# Patient Record
Sex: MALE | HISPANIC OR LATINO | Employment: UNEMPLOYED | URBAN - METROPOLITAN AREA
[De-identification: names, ages, dates, MRNs, and addresses within clinical notes are randomized per-mention and may not be internally consistent; named-entity substitution may affect disease eponyms.]

---

## 2022-02-11 ENCOUNTER — OFFICE VISIT (OUTPATIENT)
Dept: INTERNAL MEDICINE | Facility: CLINIC | Age: 47
End: 2022-02-11

## 2022-02-11 VITALS
TEMPERATURE: 98.2 F | OXYGEN SATURATION: 98 % | SYSTOLIC BLOOD PRESSURE: 133 MMHG | HEIGHT: 70 IN | HEART RATE: 88 BPM | BODY MASS INDEX: 38.2 KG/M2 | WEIGHT: 266.8 LBS | DIASTOLIC BLOOD PRESSURE: 90 MMHG

## 2022-02-11 DIAGNOSIS — M54.2 ANTERIOR NECK PAIN: Primary | ICD-10-CM

## 2022-02-11 DIAGNOSIS — Z00.00 ANNUAL PHYSICAL EXAM: ICD-10-CM

## 2022-02-11 DIAGNOSIS — Z11.59 NEED FOR HEPATITIS C SCREENING TEST: ICD-10-CM

## 2022-02-11 DIAGNOSIS — R03.0 ELEVATED BLOOD PRESSURE READING: ICD-10-CM

## 2022-02-11 PROCEDURE — 99204 OFFICE O/P NEW MOD 45 MIN: CPT | Performed by: NURSE PRACTITIONER

## 2022-02-11 RX ORDER — TRAZODONE HYDROCHLORIDE 50 MG/1
50 TABLET ORAL NIGHTLY
COMMUNITY
End: 2023-02-10 | Stop reason: SDUPTHER

## 2022-02-11 RX ORDER — CLONAZEPAM 0.5 MG/1
0.5 TABLET ORAL NIGHTLY PRN
COMMUNITY
End: 2022-06-09

## 2022-02-13 NOTE — PROGRESS NOTES
"Chief Complaint  Establish Care (pt states he turned his neck a certain way and he heard a pop or a crack and he feels pain. he says when he swallows he also hears the pop or click. )  Subjective        History of Present Illness  Laci Kellogg is a 46 y.o. male who presents to [facility] to establish care and for complaint of neck pain for 2 weeks. This started while laying in bed. When he turns his neck or swallows he hears a popping and feels pain  The pain is located anterior left side. Negative for numbness or tingling.  He denies injury or trauma.    Past Medical History:   Diagnosis Date   • Anxiety    • Insomnia         History reviewed. No pertinent surgical history.     No Known Allergies       Current Outpatient Medications:   •  clonazePAM (KlonoPIN) 0.5 MG tablet, Take 0.5 mg by mouth At Night As Needed for Anxiety., Disp: , Rfl:   •  traZODone (DESYREL) 100 MG tablet, Take 50 mg by mouth Every Night., Disp: , Rfl:   •  diclofenac (VOLTAREN) 50 MG EC tablet, Take 1 tablet by mouth 2 (Two) Times a Day., Disp: 28 tablet, Rfl: 0    Objective   /90 (BP Location: Left arm, Patient Position: Sitting, Cuff Size: Adult)   Pulse 88   Temp 98.2 °F (36.8 °C) (Temporal)   Ht 177.8 cm (70\")   Wt 121 kg (266 lb 12.8 oz)   SpO2 98%   BMI 38.28 kg/m²    Estimated body mass index is 38.28 kg/m² as calculated from the following:    Height as of this encounter: 177.8 cm (70\").    Weight as of this encounter: 121 kg (266 lb 12.8 oz).   Physical Exam  Vitals reviewed.   HENT:      Head: Normocephalic and atraumatic.      Comments: Left TMJ non-tender to palpation  Neck:      Comments: Cervical spine non-tender to palpation; free active ROM; anterior left lateral neck base with tenderness to palpation and edema  Pulmonary:      Effort: Pulmonary effort is normal.   Neurological:      Mental Status: He is alert.   Psychiatric:         Mood and Affect: Mood is anxious.         " Speech: Speech normal.         Behavior: Behavior is cooperative.         Thought Content: Thought content normal.        Result Review :                   Assessment and Plan    Diagnoses and all orders for this visit:    1. Anterior neck pain (Primary)  Comments:  Use anti-inflammatory for 2 weeks and take with food. Use ice or heat therapy. U/S to check swelling; consider further testing if no improvement.  Orders:  -     US Head Neck Soft Tissue; Future  -     diclofenac (VOLTAREN) 50 MG EC tablet; Take 1 tablet by mouth 2 (Two) Times a Day.  Dispense: 28 tablet; Refill: 0    2. Elevated blood pressure reading  Comments:  Advise to get BP home monitor and check twice daily and return with readings.    3. Annual physical exam  Comments:  Wellness labs ordered.  Orders:  -     Comprehensive Metabolic Panel; Future  -     CBC & Differential; Future  -     Lipid Panel; Future  -     T4, Free; Future  -     TSH; Future  -     Vitamin D 25 Hydroxy; Future    4. Need for hepatitis C screening test  -     Hepatitis C Antibody; Future      Follow Up     Patient was given instructions and counseling regarding his condition. Please see specific information pulled into the AVS if appropriate.   Return in about 2 weeks (around 2/25/2022).    ELIANA Cazares

## 2022-02-15 ENCOUNTER — HOSPITAL ENCOUNTER (OUTPATIENT)
Dept: ULTRASOUND IMAGING | Facility: HOSPITAL | Age: 47
Discharge: HOME | End: 2022-02-15
Admitting: NURSE PRACTITIONER

## 2022-02-15 DIAGNOSIS — M54.2 ANTERIOR NECK PAIN: ICD-10-CM

## 2022-02-15 PROCEDURE — 76536 US EXAM OF HEAD AND NECK: CPT

## 2022-02-17 ENCOUNTER — TELEPHONE (OUTPATIENT)
Dept: INTERNAL MEDICINE | Facility: CLINIC | Age: 47
End: 2022-02-17

## 2022-02-17 NOTE — TELEPHONE ENCOUNTER
The pt would like to have the MRI done, if you could put that in he would greatly appreciate it. I would pend it but I dont know what MRI to pend.

## 2022-02-18 ENCOUNTER — APPOINTMENT (OUTPATIENT)
Dept: ULTRASOUND IMAGING | Facility: HOSPITAL | Age: 47
End: 2022-02-18

## 2022-02-18 DIAGNOSIS — M54.2 ANTERIOR NECK PAIN: Primary | ICD-10-CM

## 2022-02-22 ENCOUNTER — LAB (OUTPATIENT)
Dept: LAB | Facility: HOSPITAL | Age: 47
End: 2022-02-22

## 2022-02-22 DIAGNOSIS — Z00.00 ANNUAL PHYSICAL EXAM: ICD-10-CM

## 2022-02-22 DIAGNOSIS — Z11.59 NEED FOR HEPATITIS C SCREENING TEST: ICD-10-CM

## 2022-02-22 LAB
25(OH)D3 SERPL-MCNC: 28.3 NG/ML (ref 30–100)
ALBUMIN SERPL-MCNC: 4.6 G/DL (ref 3.5–5.2)
ALBUMIN/GLOB SERPL: 2.2 G/DL
ALP SERPL-CCNC: 82 U/L (ref 39–117)
ALT SERPL W P-5'-P-CCNC: 20 U/L (ref 1–41)
ANION GAP SERPL CALCULATED.3IONS-SCNC: 13 MMOL/L (ref 5–15)
AST SERPL-CCNC: 16 U/L (ref 1–40)
BASOPHILS # BLD AUTO: 0.06 10*3/MM3 (ref 0–0.2)
BASOPHILS NFR BLD AUTO: 0.6 % (ref 0–1.5)
BILIRUB SERPL-MCNC: 0.7 MG/DL (ref 0–1.2)
BUN SERPL-MCNC: 12 MG/DL (ref 6–20)
BUN/CREAT SERPL: 11.9 (ref 7–25)
CALCIUM SPEC-SCNC: 9.7 MG/DL (ref 8.6–10.5)
CHLORIDE SERPL-SCNC: 100 MMOL/L (ref 98–107)
CHOLEST SERPL-MCNC: 206 MG/DL (ref 0–200)
CO2 SERPL-SCNC: 24 MMOL/L (ref 22–29)
CREAT SERPL-MCNC: 1.01 MG/DL (ref 0.76–1.27)
DEPRECATED RDW RBC AUTO: 45.1 FL (ref 37–54)
EOSINOPHIL # BLD AUTO: 0.17 10*3/MM3 (ref 0–0.4)
EOSINOPHIL NFR BLD AUTO: 1.7 % (ref 0.3–6.2)
ERYTHROCYTE [DISTWIDTH] IN BLOOD BY AUTOMATED COUNT: 13.6 % (ref 12.3–15.4)
GFR SERPL CREATININE-BSD FRML MDRD: 80 ML/MIN/1.73
GFR SERPL CREATININE-BSD FRML MDRD: 97 ML/MIN/1.73
GLOBULIN UR ELPH-MCNC: 2.1 GM/DL
GLUCOSE SERPL-MCNC: 79 MG/DL (ref 65–99)
HCT VFR BLD AUTO: 46.6 % (ref 37.5–51)
HCV AB SER DONR QL: NORMAL
HDLC SERPL-MCNC: 48 MG/DL (ref 40–60)
HGB BLD-MCNC: 15.5 G/DL (ref 13–17.7)
IMM GRANULOCYTES # BLD AUTO: 0.03 10*3/MM3 (ref 0–0.05)
IMM GRANULOCYTES NFR BLD AUTO: 0.3 % (ref 0–0.5)
LDLC SERPL CALC-MCNC: 141 MG/DL (ref 0–100)
LDLC/HDLC SERPL: 2.91 {RATIO}
LYMPHOCYTES # BLD AUTO: 1.61 10*3/MM3 (ref 0.7–3.1)
LYMPHOCYTES NFR BLD AUTO: 15.7 % (ref 19.6–45.3)
MCH RBC QN AUTO: 30.1 PG (ref 26.6–33)
MCHC RBC AUTO-ENTMCNC: 33.3 G/DL (ref 31.5–35.7)
MCV RBC AUTO: 90.5 FL (ref 79–97)
MONOCYTES # BLD AUTO: 0.64 10*3/MM3 (ref 0.1–0.9)
MONOCYTES NFR BLD AUTO: 6.2 % (ref 5–12)
NEUTROPHILS NFR BLD AUTO: 7.75 10*3/MM3 (ref 1.7–7)
NEUTROPHILS NFR BLD AUTO: 75.5 % (ref 42.7–76)
NRBC BLD AUTO-RTO: 0 /100 WBC (ref 0–0.2)
PLATELET # BLD AUTO: 329 10*3/MM3 (ref 140–450)
PMV BLD AUTO: 10.5 FL (ref 6–12)
POTASSIUM SERPL-SCNC: 4.5 MMOL/L (ref 3.5–5.2)
PROT SERPL-MCNC: 6.7 G/DL (ref 6–8.5)
RBC # BLD AUTO: 5.15 10*6/MM3 (ref 4.14–5.8)
SODIUM SERPL-SCNC: 137 MMOL/L (ref 136–145)
T4 FREE SERPL-MCNC: 1.46 NG/DL (ref 0.93–1.7)
TRIGL SERPL-MCNC: 92 MG/DL (ref 0–150)
TSH SERPL DL<=0.05 MIU/L-ACNC: 1.71 UIU/ML (ref 0.27–4.2)
VLDLC SERPL-MCNC: 17 MG/DL (ref 5–40)
WBC NRBC COR # BLD: 10.26 10*3/MM3 (ref 3.4–10.8)

## 2022-02-22 PROCEDURE — 82306 VITAMIN D 25 HYDROXY: CPT

## 2022-02-22 PROCEDURE — 80050 GENERAL HEALTH PANEL: CPT

## 2022-02-22 PROCEDURE — 80061 LIPID PANEL: CPT

## 2022-02-22 PROCEDURE — 36415 COLL VENOUS BLD VENIPUNCTURE: CPT

## 2022-02-22 PROCEDURE — 86803 HEPATITIS C AB TEST: CPT

## 2022-02-22 PROCEDURE — 84439 ASSAY OF FREE THYROXINE: CPT

## 2022-02-25 ENCOUNTER — OFFICE VISIT (OUTPATIENT)
Dept: INTERNAL MEDICINE | Facility: CLINIC | Age: 47
End: 2022-02-25

## 2022-02-25 VITALS
HEIGHT: 70 IN | BODY MASS INDEX: 37.91 KG/M2 | WEIGHT: 264.8 LBS | TEMPERATURE: 98.4 F | DIASTOLIC BLOOD PRESSURE: 86 MMHG | HEART RATE: 86 BPM | SYSTOLIC BLOOD PRESSURE: 121 MMHG | OXYGEN SATURATION: 98 %

## 2022-02-25 DIAGNOSIS — M54.2 ANTERIOR NECK PAIN: ICD-10-CM

## 2022-02-25 DIAGNOSIS — Z00.00 ANNUAL PHYSICAL EXAM: Primary | ICD-10-CM

## 2022-02-25 DIAGNOSIS — E55.9 VITAMIN D INSUFFICIENCY: ICD-10-CM

## 2022-02-25 PROCEDURE — 99396 PREV VISIT EST AGE 40-64: CPT | Performed by: NURSE PRACTITIONER

## 2022-02-25 NOTE — PROGRESS NOTES
"Chief Complaint  Annual Exam (PT been having neck problems for about 4 weeks, He had a ultrasound done of his neck and no issues but he's still having pain in neck.)  Subjective        History of Present Illness  Thiago Kellogg presents to [facility] for:     1. His annual physical exam. Recent labs reviewed.  Last history of 25 years 1 PPD and quit 4 years ago.     2.  Follow-up anterior left neck pain and popping.  Ultrasound revealed: Slightly prominent but otherwise normal appearing left cervical lymph nodes. Popping with swallowing is getting better. Pain is almost gone after resting from gym. He was having loose stools from diclofenac and stopped taking it after 3 days.       Current Outpatient Medications:   •  traZODone (DESYREL) 100 MG tablet, Take 50 mg by mouth Every Night., Disp: , Rfl:   •  clonazePAM (KlonoPIN) 0.5 MG tablet, Take 0.5 mg by mouth At Night As Needed for Anxiety., Disp: , Rfl:   •  diclofenac (VOLTAREN) 50 MG EC tablet, Take 1 tablet by mouth 2 (Two) Times a Day., Disp: 28 tablet, Rfl: 0  No Known Allergies   Past Medical History:   Diagnosis Date   • Anxiety    • Insomnia       History reviewed. No pertinent surgical history.   Objective   /86 (BP Location: Left arm, Patient Position: Sitting, Cuff Size: Adult)   Pulse 86   Temp 98.4 °F (36.9 °C) (Oral)   Ht 177.8 cm (70\")   Wt 120 kg (264 lb 12.8 oz)   SpO2 98%   BMI 37.99 kg/m²    Estimated body mass index is 37.99 kg/m² as calculated from the following:    Height as of this encounter: 177.8 cm (70\").    Weight as of this encounter: 120 kg (264 lb 12.8 oz).   Physical Exam  Vitals reviewed.   Constitutional:       General: He is not in acute distress.     Appearance: Normal appearance.   HENT:      Head: Normocephalic and atraumatic.      Right Ear: Tympanic membrane, ear canal and external ear normal.      Left Ear: Tympanic membrane, ear canal and external ear normal.      " Mouth/Throat:      Mouth: Mucous membranes are moist.      Pharynx: Oropharynx is clear.   Eyes:      Conjunctiva/sclera: Conjunctivae normal.      Pupils: Pupils are equal, round, and reactive to light.   Cardiovascular:      Rate and Rhythm: Normal rate and regular rhythm.      Heart sounds: Normal heart sounds. No murmur heard.      Pulmonary:      Effort: Pulmonary effort is normal.      Breath sounds: Normal breath sounds. No wheezing, rhonchi or rales.   Abdominal:      General: Abdomen is flat. There is no distension.      Palpations: Abdomen is soft. There is no mass.      Tenderness: There is no abdominal tenderness.   Musculoskeletal:      Cervical back: Neck supple.      Right lower leg: No edema.      Left lower leg: No edema.      Comments: Left lateral neck without edema, tenderness or deformity   Lymphadenopathy:      Cervical: No cervical adenopathy.   Skin:     General: Skin is warm and dry.      Coloration: Skin is not jaundiced or pale.   Neurological:      General: No focal deficit present.      Mental Status: He is alert.   Psychiatric:         Mood and Affect: Mood normal.         Behavior: Behavior normal.         Thought Content: Thought content normal.         Judgment: Judgment normal.        Result Review :   The following data was reviewed by: ELIANA Cazares on 02/25/2022:  CMP    CMP 2/22/22   Glucose 79   BUN 12   Creatinine 1.01   eGFR Non  Am 80   eGFR African Am 97   Sodium 137   Potassium 4.5   Chloride 100   Calcium 9.7   Albumin 4.60   Total Bilirubin 0.7   Alkaline Phosphatase 82   AST (SGOT) 16   ALT (SGPT) 20           CBC w/diff    CBC w/Diff 2/22/22   WBC 10.26   RBC 5.15   Hemoglobin 15.5   Hematocrit 46.6   MCV 90.5   MCH 30.1   MCHC 33.3   RDW 13.6   Platelets 329   Neutrophil Rel % 75.5   Immature Granulocyte Rel % 0.3   Lymphocyte Rel % 15.7 (A)   Monocyte Rel % 6.2   Eosinophil Rel % 1.7   Basophil Rel % 0.6   (A) Abnormal value            Lipid Panel     Lipid Panel 2/22/22   Total Cholesterol 206 (A)   Triglycerides 92   HDL Cholesterol 48   VLDL Cholesterol 17   LDL Cholesterol  141 (A)   LDL/HDL Ratio 2.91   (A) Abnormal value            TSH    TSH 2/22/22   TSH 1.710         Vitamin D 25 Hydroxy (02/22/2022 12:25)    Data reviewed: Radiologic studies US Head Neck Soft Tissue (02/15/2022 10:14)     Assessment and Plan    Diagnoses and all orders for this visit:    1. Annual physical exam (Primary)  -     Comprehensive Metabolic Panel; Future  -     CBC & Differential; Future  -     Lipid Panel; Future  -     T4, Free; Future  -     TSH; Future    2. Vitamin D insufficiency  Comments:  Recommended 1000 to 2000 units of vitamin D supplementation daily.  Monitor.  Orders:  -     Vitamin D 25 Hydroxy; Future    3. Anterior neck pain  Comments:  Improving. Recommended anti inflammatory medication and ice/heat therapy. Hold additional imaging due to symptoms improving.      Instructions Given: Discussed healthy diet, exercise, adequate sleep, cancer screening, immunizations, and preventative care. Annual eye exam and twice yearly dental cleaning.  Follow Up     Patient was given instructions and counseling regarding his condition or for health maintenance advice. Please see specific information pulled into the AVS if appropriate.   Return in about 1 year (around 2/25/2023) for Annual physical, or if symptoms worsen or fail to improve.    ELIANA Cazares

## 2022-02-25 NOTE — PATIENT INSTRUCTIONS
Preventive Care 40-64 Years Old, Male  Preventive care refers to lifestyle choices and visits with your health care provider that can promote health and wellness. This includes:  · A yearly physical exam. This is also called an annual well check.  · Regular dental and eye exams.  · Immunizations.  · Screening for certain conditions.  · Healthy lifestyle choices, such as eating a healthy diet, getting regular exercise, not using drugs or products that contain nicotine and tobacco, and limiting alcohol use.  What can I expect for my preventive care visit?  Physical exam  Your health care provider will check:  · Height and weight. These may be used to calculate body mass index (BMI), which is a measurement that tells if you are at a healthy weight.  · Heart rate and blood pressure.  · Your skin for abnormal spots.  Counseling  Your health care provider may ask you questions about:  · Alcohol, tobacco, and drug use.  · Emotional well-being.  · Home and relationship well-being.  · Sexual activity.  · Eating habits.  · Work and work environment.  What immunizations do I need?    Influenza (flu) vaccine  · This is recommended every year.  Tetanus, diphtheria, and pertussis (Tdap) vaccine  · You may need a Td booster every 10 years.  Varicella (chickenpox) vaccine  · You may need this vaccine if you have not already been vaccinated.  Zoster (shingles) vaccine  · You may need this after age 60.  Measles, mumps, and rubella (MMR) vaccine  · You may need at least one dose of MMR if you were born in 1957 or later. You may also need a second dose.  Pneumococcal conjugate (PCV13) vaccine  · You may need this if you have certain conditions and were not previously vaccinated.  Pneumococcal polysaccharide (PPSV23) vaccine  · You may need one or two doses if you smoke cigarettes or if you have certain conditions.  Meningococcal conjugate (MenACWY) vaccine  · You may need this if you have certain conditions.  Hepatitis A  vaccine  · You may need this if you have certain conditions or if you travel or work in places where you may be exposed to hepatitis A.  Hepatitis B vaccine  · You may need this if you have certain conditions or if you travel or work in places where you may be exposed to hepatitis B.  Haemophilus influenzae type b (Hib) vaccine  · You may need this if you have certain risk factors.  Human papillomavirus (HPV) vaccine  · If recommended by your health care provider, you may need three doses over 6 months.  You may receive vaccines as individual doses or as more than one vaccine together in one shot (combination vaccines). Talk with your health care provider about the risks and benefits of combination vaccines.  What tests do I need?  Blood tests  · Lipid and cholesterol levels. These may be checked every 5 years, or more frequently if you are over 50 years old.  · Hepatitis C test.  · Hepatitis B test.  Screening  · Lung cancer screening. You may have this screening every year starting at age 55 if you have a 30-pack-year history of smoking and currently smoke or have quit within the past 15 years.  · Prostate cancer screening. Recommendations will vary depending on your family history and other risks.  · Colorectal cancer screening. All adults should have this screening starting at age 50 and continuing until age 75. Your health care provider may recommend screening at age 45 if you are at increased risk. You will have tests every 1-10 years, depending on your results and the type of screening test.  · Diabetes screening. This is done by checking your blood sugar (glucose) after you have not eaten for a while (fasting). You may have this done every 1-3 years.  · Sexually transmitted disease (STD) testing.  Follow these instructions at home:  Eating and drinking  · Eat a diet that includes fresh fruits and vegetables, whole grains, lean protein, and low-fat dairy products.  · Take vitamin and mineral supplements as  recommended by your health care provider.  · Do not drink alcohol if your health care provider tells you not to drink.  · If you drink alcohol:  ? Limit how much you have to 0-2 drinks a day.  ? Be aware of how much alcohol is in your drink. In the [place] one drink equals one 12 oz bottle of beer (355 mL), one 5 oz glass of wine (148 mL), or one 1½ oz glass of hard liquor (44 mL).  Lifestyle  · Take daily care of your teeth and gums.  · Stay active. Exercise for at least 30 minutes on 5 or more days each week.  · Do not use any products that contain nicotine or tobacco, such as cigarettes, e-cigarettes, and chewing tobacco. If you need help quitting, ask your health care provider.  · If you are sexually active, practice safe sex. Use a condom or other form of protection to prevent STIs (sexually transmitted infections).  · Talk with your health care provider about taking a low-dose aspirin every day starting at age 50.  What's next?  · Go to your health care provider once a year for a well check visit.  · Ask your health care provider how often you should have your eyes and teeth checked.  · Stay up to date on all vaccines.  This information is not intended to replace advice given to you by your health care provider. Make sure you discuss any questions you have with your health care provider.  Document Revised: 12/12/2019 Document Reviewed: 12/12/2019  [employer] Patient Education © 2020 [employer]    Lipid Profile Test  Why am I having this test?  The lipid profile test can be used to help evaluate your risk for developing heart disease. The test is also used to monitor your levels during treatment for high cholesterol to see if you are reaching your goals.  What is being tested?  A lipid profile measures the following:  · Total cholesterol. Cholesterol is a waxy, fat-like substance in your blood. If your total cholesterol level is high, this can increase your risk for heart disease.  · High-density lipoprotein  (HDL). This is known as the good cholesterol. Having high levels of HDL decreases your risk for heart disease. Your HDL level may be low if you smoke or do not get enough exercise.  · Low-density lipoprotein (LDL). This is known as the bad cholesterol. This type causes plaque to build up in your arteries. Having a low level of LDL is best. Having high levels of LDL increases your risk for heart disease.  · Cholesterol to HDL ratio. This is calculated by dividing your total cholesterol by your HDL cholesterol. The ratio is used by health care providers to determine your risk for heart disease. A low ratio is best.  · Triglycerides. These are fats that your body can store or burn for energy. Low levels are best. Having high levels of triglycerides increases your risk for heart disease.  What kind of sample is taken?    A blood sample is required for this test. It is usually collected by inserting a needle into a blood vessel.  How do I prepare for this test?  Do not drink alcohol starting at least 24 hours before your test.  Follow any instructions from your health care provider about dietary restrictions before your test.  Do not eat or drink anything other than water after midnight on the night before the test, or as told by your health care provider.  Tell a health care provider about:  · All medicines you are taking, including vitamins, herbs, eye drops, creams, and over-the-counter medicines.  · Any medical conditions you have.  · Whether you are pregnant or may be pregnant.  How are the results reported?  Your test results will be reported as values that indicate your cholesterol and triglyceride levels. Your health care provider will compare your results to normal ranges that were established after testing a large group of people (reference ranges). Reference ranges may vary among labs and hospitals. For this test, common reference ranges are:  Total cholesterol  · Adult or elderly: less than 200  mg/dL.  · Child: 120-200 mg/dL.  · Infant:  mg/dL.  · :  mg/dL.  HDL  · Male: greater than 45 mg/dL.  · Female: greater than 55 mg/dL.  HDL reference values based on your risk for heart disease:  · Low risk for heart disease:  ? Male: 60 mg/dL.  ? Female: 70 mg/dL.  · Moderate risk for heart disease:  ? Male: 45 mg/dL.  ? Female: 55 mg/dL.  · High risk for heart disease:  ? Male: 25 mg/dL.  ? Female: 35 mg/dL.  LDL  · Adults: Your health care provider will determine a target level for LDL based on your risk for heart disease.  ? If you are at low risk, your LDL should be 130 mg/dL or less.  ? If you are at moderate risk, your LDL should be 100 mg/dL or less.  ? If you are at high risk, your LDL should be 70 mg/dL or less.  · Children: less than 110 mg/dL.  Cholesterol to HDL ratio  Reference values based on your risk for heart disease:  · Risk that is half the average risk:  ? Male: 3.4.  ? Female: 3.3.  · Average risk:  ? Male: 5.0.  ? Female: 4.4.  · Risk that is two times average (moderate risk):  ? Male: 10.0.  ? Female: 7.0.  · Risk that is three times average (high risk):  ? Male: 24.0.  ? Female: 11.0.  Triglycerides  · Adult or elderly:  ? Male:  mg/dL.  ? Female:  mg/dL.  · Children 16-19 years old:  ? Male:  mg/dL.  ? Female:  mg/dL.  · Children 12-15 years old:  ? Male:  mg/dL.  ? Female:  mg/dL.  · Children 6-11 years old:  ? Male:  mg/dL.  ? Female:  mg/dL.  · Children 0-5 years old:  ? Male: 30-86 mg/dL.  ? Female: 32-99 mg/dL.  Triglycerides should be less than 400 mg/dL even when you are not fasting.  What do the results mean?  Results that are within the reference ranges are considered normal. Total cholesterol, LDL, and triglyceride levels that are higher than the reference ranges can mean that you have an increased risk for heart disease. An HDL level that is lower than the reference range can also indicate an increased risk.  Talk  with your health care provider about what your results mean.  Questions to ask your health care provider  Ask your health care provider, or the department that is doing the test:  · When will my results be ready?  · How will I get my results?  · What are my treatment options?  · What other tests do I need?  · What are my next steps?  Summary  · The lipid profile test can be used to help predict the likelihood that you will develop heart disease. It can also help monitor your cholesterol levels during treatment.  · A lipid profile measures your total cholesterol, high-density lipoprotein (HDL), low-density lipoprotein (LDL), cholesterol to HDL ratio, and triglycerides.  · Total cholesterol, LDL, and triglyceride levels that are higher than the reference ranges can indicate an increased risk for heart disease.  · An HDL level that is lower than the reference range can indicate an increased risk for heart disease.  · Talk with your health care provider about what your results mean.  This information is not intended to replace advice given to you by your health care provider. Make sure you discuss any questions you have with your health care provider.  Document Revised: 04/07/2021 Document Reviewed: 04/07/2021  [employer] Patient Education © 2021 [employer]

## 2022-02-28 ENCOUNTER — APPOINTMENT (OUTPATIENT)
Dept: CT IMAGING | Facility: HOSPITAL | Age: 47
End: 2022-02-28

## 2022-03-11 ENCOUNTER — TELEPHONE (OUTPATIENT)
Dept: INTERNAL MEDICINE | Facility: CLINIC | Age: 47
End: 2022-03-11

## 2022-03-11 ENCOUNTER — HOSPITAL ENCOUNTER (OUTPATIENT)
Dept: CT IMAGING | Facility: HOSPITAL | Age: 47
Discharge: HOME | End: 2022-03-11
Admitting: NURSE PRACTITIONER

## 2022-03-11 DIAGNOSIS — R91.8 GROUND GLASS OPACITY PRESENT ON IMAGING OF LUNG: Primary | ICD-10-CM

## 2022-03-11 DIAGNOSIS — M54.2 ANTERIOR NECK PAIN: ICD-10-CM

## 2022-03-11 PROCEDURE — 70491 CT SOFT TISSUE NECK W/DYE: CPT

## 2022-03-11 PROCEDURE — 0 IOPAMIDOL PER 1 ML: Performed by: NURSE PRACTITIONER

## 2022-03-11 RX ADMIN — IOPAMIDOL 100 ML: 755 INJECTION, SOLUTION INTRAVENOUS at 12:50

## 2022-03-28 ENCOUNTER — OFFICE VISIT (OUTPATIENT)
Dept: PULMONOLOGY | Facility: CLINIC | Age: 47
End: 2022-03-28

## 2022-03-28 VITALS
SYSTOLIC BLOOD PRESSURE: 124 MMHG | RESPIRATION RATE: 18 BRPM | TEMPERATURE: 99.2 F | WEIGHT: 250 LBS | BODY MASS INDEX: 35.79 KG/M2 | DIASTOLIC BLOOD PRESSURE: 81 MMHG | OXYGEN SATURATION: 98 % | HEIGHT: 70 IN | HEART RATE: 83 BPM

## 2022-03-28 DIAGNOSIS — J45.20 MILD INTERMITTENT ASTHMA WITHOUT COMPLICATION: Primary | ICD-10-CM

## 2022-03-28 PROCEDURE — 99203 OFFICE O/P NEW LOW 30 MIN: CPT | Performed by: INTERNAL MEDICINE

## 2022-03-28 RX ORDER — ALBUTEROL SULFATE 90 UG/1
INHALANT RESPIRATORY (INHALATION) AS NEEDED
COMMUNITY

## 2022-03-28 NOTE — PROGRESS NOTES
Pulmonary Consultation    Ruma Escamilla APRN,    Thank you for asking me to see Thiago Kellogg for   Chief Complaint   Patient presents with   • Establish Care     New Patient    • Abnormal CT   .      History of Present Illness  Thiago Kellogg is a 46 y.o. male with a PMH significant for bronchial asthma and tobacco abuse presents for evaluation of abnormal CT scan patient had a CT scan of the neck done for a possible swelling in the neck which has now resolved his CT scan showed patchy opacities in the right upper lobe patient denies any cough fever night sweats he denies any chest pain or weight loss patient has already quit smoking he uses albuterol inhaler as needed intermittently only for his bronchial asthma       Tobacco use history: Former smoker      Review of Systems: History obtained from chart review and the patient.  Review of Systems   All other systems reviewed and are negative.    As described in the HPI. Otherwise, remainder of ROS (14 systems) were negative.    There is no problem list on file for this patient.        Current Outpatient Medications:   •  Albuterol Sulfate, sensor, (ProAir Digihaler) 108 (90 Base) MCG/ACT aerosol powder , Inhale., Disp: , Rfl:   •  clonazePAM (KlonoPIN) 0.5 MG tablet, Take 0.5 mg by mouth At Night As Needed for Anxiety., Disp: , Rfl:   •  diclofenac (VOLTAREN) 50 MG EC tablet, Take 1 tablet by mouth 2 (Two) Times a Day., Disp: 28 tablet, Rfl: 0  •  traZODone (DESYREL) 100 MG tablet, Take 50 mg by mouth Every Night., Disp: , Rfl:     No Known Allergies    Past Medical History:   Diagnosis Date   • Anxiety    • Asthma, intrinsic c. 2018   • Hypertension February 2022   • Insomnia      History reviewed. No pertinent surgical history.  Social History     Socioeconomic History   • Marital status:    Tobacco Use   • Smoking status: Former Smoker     Packs/day: 1.00     Years: 25.00     Pack years: 25.00     Types: Cigarettes     Start date: 6/1/1993     " Quit date: 6/1/2018     Years since quitting: 3.8   • Smokeless tobacco: Never Used   • Tobacco comment: Smoked 1 pack a day for first 15 years, half a pack for last 10 years of smoking.   Vaping Use   • Vaping Use: Former   • Substances: Nicotine, Flavoring   • Devices: Pre-filled or refillable cartridge   Substance and Sexual Activity   • Alcohol use: Yes     Alcohol/week: 6.0 standard drinks     Types: 6 Cans of beer per week   • Drug use: Not Currently     Frequency: 5.0 times per week     Types: Marijuana     Comment: Used to use marijuana 5 times per week, off and on last 25 y   • Sexual activity: Not Currently     Family History   Problem Relation Age of Onset   • Cancer Mother    • Cancer Paternal Grandfather           Objective     Blood pressure 124/81, pulse 83, temperature 99.2 °F (37.3 °C), temperature source Temporal, resp. rate 18, height 177.8 cm (70\"), weight 113 kg (250 lb), SpO2 98 %.  Physical Exam  Vitals and nursing note reviewed.   Constitutional:       Appearance: He is obese.   HENT:      Head: Normocephalic and atraumatic.      Nose: Nose normal.      Mouth/Throat:      Mouth: Mucous membranes are moist.   Eyes:      Extraocular Movements: Extraocular movements intact.      Pupils: Pupils are equal, round, and reactive to light.   Cardiovascular:      Rate and Rhythm: Normal rate and regular rhythm.      Pulses: Normal pulses.      Heart sounds: Normal heart sounds.   Pulmonary:      Effort: Pulmonary effort is normal.      Breath sounds: Normal breath sounds.   Abdominal:      General: Abdomen is flat. Bowel sounds are normal.      Palpations: Abdomen is soft.   Musculoskeletal:         General: Normal range of motion.      Cervical back: Normal range of motion and neck supple.   Skin:     General: Skin is warm.      Capillary Refill: Capillary refill takes less than 2 seconds.   Neurological:      General: No focal deficit present.      Mental Status: He is alert.   Psychiatric:         " Mood and Affect: Mood normal.         Behavior: Behavior normal.       Immunization History   Administered Date(s) Administered   • COVID-19 (MODERNA) 1st, 2nd, 3rd Dose Only 04/01/2021, 05/01/2021, 10/01/2021   • Flu Vaccine Intradermal Quad 18-64YR 10/01/2021         Radiology (independently reviewed and interpreted by me): CT scan reviewed shows patchy opacities right upper lobe likely secondary to pneumonia         Assessment/Plan     Diagnoses and all orders for this visit:    1. Mild intermittent asthma without complication (Primary)         Discussion/ Recommendations:   Patient is reassured he is advised to continue his albuterol inhaler 2 puffs every 6 as needed  Patient has already quit smoking  Patient is advised regular exercise and weight reduction  Discussed vaccination and recommended    Patient's Body mass index is 35.87 kg/m². indicating that he is obese (BMI >30). Obesity-related health conditions include the following: none. Obesity is unchanged. BMI is is above average; BMI management plan is completed. We discussed low calorie, low carb based diet program, portion control and increasing exercise..           Return in about 1 year (around 3/28/2023).      Thank you for allowing me to participate in the care of Thiago Kellogg. Please do not hesitate to contact me with any questions.         This document has been electronically signed by Andrew Carlson MD on March 28, 2022 15:44 EDT

## 2022-05-13 ENCOUNTER — OFFICE VISIT (OUTPATIENT)
Dept: INTERNAL MEDICINE | Facility: CLINIC | Age: 47
End: 2022-05-13

## 2022-05-13 VITALS
BODY MASS INDEX: 39.51 KG/M2 | HEART RATE: 81 BPM | HEIGHT: 70 IN | OXYGEN SATURATION: 99 % | RESPIRATION RATE: 20 BRPM | TEMPERATURE: 98.4 F | WEIGHT: 276 LBS | SYSTOLIC BLOOD PRESSURE: 118 MMHG | DIASTOLIC BLOOD PRESSURE: 82 MMHG

## 2022-05-13 DIAGNOSIS — N45.1 ACUTE EPIDIDYMITIS: Primary | ICD-10-CM

## 2022-05-13 DIAGNOSIS — Z00.8 EVALUATION BY PSYCHIATRIC SERVICE REQUIRED: ICD-10-CM

## 2022-05-13 PROCEDURE — 99213 OFFICE O/P EST LOW 20 MIN: CPT

## 2022-05-13 NOTE — PROGRESS NOTES
"Chief Complaint  Follow-up (Patient was seen at urgent care for pain in his groin that started last Thursday night,  and was given doxycycline. Pt states that pain has cleared up for the most part but he still has the pain every now and then. Patient still currently on ABX//Patient states sometimes he can hear/feel his heart beating out of his chest, especially in a quiet room. Was wondering if this was normal.)    Subjective          Thiago Kellogg presents to [facility]  History of Present Illness    Thiago is here for an urgent care follow-up.  He reported to urgent care last Thursday night for groin pain.  He was given antibiotics and diagnosed with epididymitis.  Patient states that the pain has gotten better for the most part but he still has intermittent discomfort every now and then.  He is still on antibiotic treatment.  He denies any significant swelling of his testicles.  He denies any redness of his testicles.    Objective   Vital Signs:   /82 (BP Location: Left arm, Patient Position: Sitting, Cuff Size: Adult)   Pulse 81   Temp 98.4 °F (36.9 °C) (Temporal)   Resp 20   Ht 177.8 cm (70\")   Wt 125 kg (276 lb)   SpO2 99%   BMI 39.60 kg/m²     Physical Exam  Vitals reviewed.   Constitutional:       Appearance: Normal appearance. He is well-developed.   HENT:      Head: Normocephalic and atraumatic.      Mouth/Throat:      Pharynx: No oropharyngeal exudate.   Eyes:      Conjunctiva/sclera: Conjunctivae normal.      Pupils: Pupils are equal, round, and reactive to light.   Cardiovascular:      Rate and Rhythm: Normal rate and regular rhythm.      Heart sounds: No murmur heard.    No friction rub. No gallop.   Pulmonary:      Effort: Pulmonary effort is normal.      Breath sounds: Normal breath sounds. No wheezing or rhonchi.   Skin:     General: Skin is warm and dry.   Neurological:      Mental Status: He is alert and oriented to person, place, and time. "   Psychiatric:         Mood and Affect: Affect normal.        Result Review :          Procedures      Assessment and Plan    Diagnoses and all orders for this visit:    1. Acute epididymitis (Primary)  Comments:  Encourage patient to continue antibiotics and to follow-up if symptoms or not significantly improved after finishing.    2. Evaluation by psychiatric service required  Comments:  Patient is requesting referral to psych office.  He reports having anxiety and thinks that it is worsening and would like to start someone.  Orders:  -     Ambulatory Referral to Psychiatry    Discussed with patient signs and symptoms of worsening epididymitis or possible testicular torsion.  Patient denies any significant pain and states pain/discomfort is improving.  Advised patient that if symptoms were to significantly worsen or pain actually develops he needs to go to the emergency department for further evaluation and testicular ultrasound to rule out any significant illness or testicular torsion.  Patient verbalized understanding of treatment plan.      Follow Up   Return if symptoms worsen or fail to improve.  Patient was given instructions and counseling regarding his condition or for health maintenance advice. Please see specific information pulled into the AVS if appropriate.

## 2022-05-27 ENCOUNTER — HOSPITAL ENCOUNTER (EMERGENCY)
Facility: HOSPITAL | Age: 47
Discharge: HOME | End: 2022-05-27
Attending: EMERGENCY MEDICINE | Admitting: EMERGENCY MEDICINE

## 2022-05-27 ENCOUNTER — OFFICE VISIT (OUTPATIENT)
Dept: INTERNAL MEDICINE | Facility: CLINIC | Age: 47
End: 2022-05-27

## 2022-05-27 VITALS
OXYGEN SATURATION: 96 % | SYSTOLIC BLOOD PRESSURE: 105 MMHG | HEIGHT: 70 IN | WEIGHT: 273.59 LBS | BODY MASS INDEX: 39.17 KG/M2 | TEMPERATURE: 98.1 F | DIASTOLIC BLOOD PRESSURE: 75 MMHG | HEART RATE: 68 BPM | RESPIRATION RATE: 18 BRPM

## 2022-05-27 VITALS
HEIGHT: 70 IN | OXYGEN SATURATION: 99 % | HEART RATE: 82 BPM | BODY MASS INDEX: 39.22 KG/M2 | DIASTOLIC BLOOD PRESSURE: 93 MMHG | TEMPERATURE: 98.2 F | SYSTOLIC BLOOD PRESSURE: 144 MMHG | WEIGHT: 274 LBS

## 2022-05-27 DIAGNOSIS — R10.32 LEFT GROIN PAIN: ICD-10-CM

## 2022-05-27 DIAGNOSIS — R07.89 ATYPICAL CHEST PAIN: Primary | ICD-10-CM

## 2022-05-27 DIAGNOSIS — R07.9 CHEST PAIN, UNSPECIFIED TYPE: Primary | ICD-10-CM

## 2022-05-27 DIAGNOSIS — Z72.52 HIGH RISK HOMOSEXUAL BEHAVIOR: ICD-10-CM

## 2022-05-27 LAB
ALBUMIN SERPL-MCNC: 4.8 G/DL (ref 3.5–5.2)
ALBUMIN/GLOB SERPL: 1.5 G/DL
ALP SERPL-CCNC: 102 U/L (ref 39–117)
ALT SERPL W P-5'-P-CCNC: 26 U/L (ref 1–41)
ANION GAP SERPL CALCULATED.3IONS-SCNC: 11.8 MMOL/L (ref 5–15)
AST SERPL-CCNC: 20 U/L (ref 1–40)
BASOPHILS # BLD AUTO: 0.06 10*3/MM3 (ref 0–0.2)
BASOPHILS NFR BLD AUTO: 1 % (ref 0–1.5)
BILIRUB SERPL-MCNC: 0.5 MG/DL (ref 0–1.2)
BUN SERPL-MCNC: 14 MG/DL (ref 6–20)
BUN/CREAT SERPL: 14 (ref 7–25)
CALCIUM SPEC-SCNC: 10.2 MG/DL (ref 8.6–10.5)
CHLORIDE SERPL-SCNC: 100 MMOL/L (ref 98–107)
CO2 SERPL-SCNC: 25.2 MMOL/L (ref 22–29)
CREAT SERPL-MCNC: 1 MG/DL (ref 0.76–1.27)
D DIMER PPP FEU-MCNC: 0.28 MCGFEU/ML (ref 0–0.57)
DEPRECATED RDW RBC AUTO: 38.8 FL (ref 37–54)
EGFRCR SERPLBLD CKD-EPI 2021: 95.2 ML/MIN/1.73
EOSINOPHIL # BLD AUTO: 0.14 10*3/MM3 (ref 0–0.4)
EOSINOPHIL NFR BLD AUTO: 2.4 % (ref 0.3–6.2)
ERYTHROCYTE [DISTWIDTH] IN BLOOD BY AUTOMATED COUNT: 12.4 % (ref 12.3–15.4)
GLOBULIN UR ELPH-MCNC: 3.3 GM/DL
GLUCOSE SERPL-MCNC: 111 MG/DL (ref 65–99)
HCT VFR BLD AUTO: 45.5 % (ref 37.5–51)
HGB BLD-MCNC: 16.2 G/DL (ref 13–17.7)
IMM GRANULOCYTES # BLD AUTO: 0.01 10*3/MM3 (ref 0–0.05)
IMM GRANULOCYTES NFR BLD AUTO: 0.2 % (ref 0–0.5)
LYMPHOCYTES # BLD AUTO: 1.47 10*3/MM3 (ref 0.7–3.1)
LYMPHOCYTES NFR BLD AUTO: 24.7 % (ref 19.6–45.3)
MCH RBC QN AUTO: 30.6 PG (ref 26.6–33)
MCHC RBC AUTO-ENTMCNC: 35.6 G/DL (ref 31.5–35.7)
MCV RBC AUTO: 85.8 FL (ref 79–97)
MONOCYTES # BLD AUTO: 0.5 10*3/MM3 (ref 0.1–0.9)
MONOCYTES NFR BLD AUTO: 8.4 % (ref 5–12)
NEUTROPHILS NFR BLD AUTO: 3.76 10*3/MM3 (ref 1.7–7)
NEUTROPHILS NFR BLD AUTO: 63.3 % (ref 42.7–76)
NRBC BLD AUTO-RTO: 0 /100 WBC (ref 0–0.2)
PLATELET # BLD AUTO: 345 10*3/MM3 (ref 140–450)
PMV BLD AUTO: 9.7 FL (ref 6–12)
POTASSIUM SERPL-SCNC: 4.5 MMOL/L (ref 3.5–5.2)
PROT SERPL-MCNC: 8.1 G/DL (ref 6–8.5)
RBC # BLD AUTO: 5.3 10*6/MM3 (ref 4.14–5.8)
SODIUM SERPL-SCNC: 137 MMOL/L (ref 136–145)
TROPONIN T SERPL-MCNC: <0.01 NG/ML (ref 0–0.03)
WBC NRBC COR # BLD: 5.94 10*3/MM3 (ref 3.4–10.8)

## 2022-05-27 PROCEDURE — 36415 COLL VENOUS BLD VENIPUNCTURE: CPT | Performed by: EMERGENCY MEDICINE

## 2022-05-27 PROCEDURE — 80053 COMPREHEN METABOLIC PANEL: CPT | Performed by: EMERGENCY MEDICINE

## 2022-05-27 PROCEDURE — 99214 OFFICE O/P EST MOD 30 MIN: CPT | Performed by: NURSE PRACTITIONER

## 2022-05-27 PROCEDURE — 85025 COMPLETE CBC W/AUTO DIFF WBC: CPT | Performed by: EMERGENCY MEDICINE

## 2022-05-27 PROCEDURE — 99283 EMERGENCY DEPT VISIT LOW MDM: CPT

## 2022-05-27 PROCEDURE — 85379 FIBRIN DEGRADATION QUANT: CPT | Performed by: EMERGENCY MEDICINE

## 2022-05-27 PROCEDURE — 84484 ASSAY OF TROPONIN QUANT: CPT | Performed by: EMERGENCY MEDICINE

## 2022-05-27 PROCEDURE — 93005 ELECTROCARDIOGRAM TRACING: CPT | Performed by: EMERGENCY MEDICINE

## 2022-05-27 RX ORDER — EMTRICITABINE AND TENOFOVIR DISOPROXIL FUMARATE 200; 300 MG/1; MG/1
1 TABLET, FILM COATED ORAL DAILY
Qty: 30 TABLET | Refills: 5 | Status: SHIPPED | OUTPATIENT
Start: 2022-05-27 | End: 2022-12-29

## 2022-05-27 NOTE — ED PROVIDER NOTES
"Time: 6:37 PM EDT  Arrived by: private car  Chief Complaint: Chest pain, palpitations  History provided by: Patient  History is limited by: N/A     History of Present Illness:  Patient is a 46 y.o. year old male who presents to the emergency department with palpitations, lightheadedness and increasing acid reflux for the past 3 weeks.  Patient states his pain is \"like someone taking the insulin of an eraser and poking me\".  Patient states that this pain only started in the past 2 to 3 days.  This pain is nonradiating, just right of midline.  It is mild to moderate in severity and worse at rest.  Patient walks 5 miles twice a week and states that his symptoms are not worse with exertion.  He does not feel any difficulty breathing associated with the symptoms.  Denies any cough or fever.  No vomiting.  Saw his outpatient doctor today and told to come here to rule out heart attack.  Patient did drive to [place] and back in 1 day in the past month or 2.  Denies any lower extremity edema or calf pain.    HPI    Similar Symptoms Previously: No  Recently seen: Yes      Patient Care Team  Primary Care Provider: Ruma Escamilla APRN    Past Medical History:     Allergies   Allergen Reactions   • Erythromycin Unknown - High Severity     Past Medical History:   Diagnosis Date   • Anxiety    • Asthma, intrinsic c. 2018   • Hypertension February 2022   • Insomnia      No past surgical history on file.  Family History   Problem Relation Age of Onset   • Cancer Mother    • Cancer Paternal Grandfather        Home Medications:  Prior to Admission medications    Medication Sig Start Date End Date Taking? Authorizing Provider   Albuterol Sulfate, sensor, (ProAir Digihaler) 108 (90 Base) MCG/ACT aerosol powder  Inhale As Needed.    Provider, MD Kelvin   cephalexin (KEFLEX) 500 MG capsule Take 1 capsule by mouth 2 (Two) Times a Day. 5/23/22   Johnnie Cotto MD   clonazePAM (KlonoPIN) 0.5 MG tablet Take 0.5 mg by mouth At " Night As Needed for Anxiety.    Provider, MD Kelvin   emtricitabine-tenofovir (Truvada) 200-300 MG per tablet Take 1 tablet by mouth Daily. 5/27/22   Ruma Escamilla APRN   traZODone (DESYREL) 100 MG tablet Take 50 mg by mouth Every Night. PRN    ProviderKelvin MD   naproxen (EC NAPROSYN) 500 MG EC tablet Take 1 tablet by mouth 2 (Two) Times a Day As Needed (knee pain). 5/23/22 5/27/22  Johnnie Cotto MD        Social History:   Social History     Tobacco Use   • Smoking status: Former Smoker     Packs/day: 1.00     Years: 25.00     Pack years: 25.00     Types: Cigarettes     Start date: 6/1/1993     Quit date: 6/1/2018     Years since quitting: 3.9   • Smokeless tobacco: Never Used   • Tobacco comment: Smoked 1 pack a day for first 15 years, half a pack for last 10 years of smoking.   Vaping Use   • Vaping Use: Former   • Substances: Nicotine, Flavoring   • Devices: Pre-filled or refillable cartridge   Substance Use Topics   • Alcohol use: Yes     Alcohol/week: 6.0 standard drinks     Types: 6 Cans of beer per week     Comment: socially   • Drug use: Not Currently     Frequency: 5.0 times per week     Types: Marijuana     Comment: Used to use marijuana 5 times per week, off and on last 25 y     Recent travel: no     Review of Systems:  Review of Systems   Constitutional: Negative for chills and fever.   HENT: Negative for congestion, rhinorrhea and sore throat.    Eyes: Negative for pain and visual disturbance.   Respiratory: Negative for apnea, cough, chest tightness and shortness of breath.    Cardiovascular: Positive for chest pain and palpitations.   Gastrointestinal: Positive for abdominal pain and nausea. Negative for diarrhea and vomiting.   Genitourinary: Negative for difficulty urinating and dysuria.   Musculoskeletal: Negative for joint swelling and myalgias.   Skin: Negative for color change.   Neurological: Positive for light-headedness. Negative for seizures and headaches.  "  Psychiatric/Behavioral: Negative.    All other systems reviewed and are negative.       Physical Exam:  /75   Pulse 68   Temp 98.2 °F (36.8 °C) (Oral)   Resp 18   Ht 177.8 cm (70\")   Wt 124 kg (273 lb 9.5 oz)   SpO2 96%   BMI 39.26 kg/m²     Physical Exam  Vitals and nursing note reviewed.   Constitutional:       Appearance: Normal appearance.   HENT:      Head: Normocephalic and atraumatic.      Nose: Nose normal.      Mouth/Throat:      Mouth: Mucous membranes are dry.   Eyes:      Extraocular Movements: Extraocular movements intact.      Pupils: Pupils are equal, round, and reactive to light.   Cardiovascular:      Rate and Rhythm: Normal rate and regular rhythm.      Heart sounds: Normal heart sounds.   Pulmonary:      Effort: Pulmonary effort is normal.      Breath sounds: Normal breath sounds.   Abdominal:      General: Bowel sounds are normal.      Palpations: Abdomen is soft.      Tenderness: There is no abdominal tenderness.   Musculoskeletal:         General: No swelling. Normal range of motion.      Cervical back: Normal range of motion and neck supple.   Skin:     General: Skin is warm and dry.      Coloration: Skin is not jaundiced.   Neurological:      General: No focal deficit present.      Mental Status: He is alert and oriented to person, place, and time. Mental status is at baseline.   Psychiatric:         Mood and Affect: Mood normal.         Behavior: Behavior normal.         Judgment: Judgment normal.                Medications in the Emergency Department:  Medications - No data to display     Labs  Lab Results (last 24 hours)     Procedure Component Value Units Date/Time    Troponin [[id number]]  (Normal) Collected: 05/27/22 1642    Specimen: Blood from Arm, Right Updated: 05/27/22 1712     Troponin T <0.010 ng/mL     Narrative:      Troponin T Reference Range:  <= 0.03 ng/mL-   Negative for AMI  >0.03 ng/mL-     Abnormal for myocardial necrosis.  Clinicians would have to utilize " clinical acumen, EKG, Troponin and serial changes to determine if it is an Acute Myocardial Infarction or myocardial injury due to an underlying chronic condition.       Results may be falsely decreased if patient taking Biotin.      CBC & Differential [[id number]]  (Normal) Collected: 05/27/22 1642    Specimen: Blood from Arm, Right Updated: 05/27/22 1651    Narrative:      The following orders were created for panel order CBC & Differential.  Procedure                               Abnormality         Status                     ---------                               -----------         ------                     CBC Auto Differential[[id number]]        Normal              Final result                 Please view results for these tests on the individual orders.    Comprehensive Metabolic Panel [[id number]]  (Abnormal) Collected: 05/27/22 1642    Specimen: Blood from Arm, Right Updated: 05/27/22 1712     Glucose 111 mg/dL      BUN 14 mg/dL      Creatinine 1.00 mg/dL      Sodium 137 mmol/L      Potassium 4.5 mmol/L      Chloride 100 mmol/L      CO2 25.2 mmol/L      Calcium 10.2 mg/dL      Total Protein 8.1 g/dL      Albumin 4.80 g/dL      ALT (SGPT) 26 U/L      AST (SGOT) 20 U/L      Alkaline Phosphatase 102 U/L      Total Bilirubin 0.5 mg/dL      Globulin 3.3 gm/dL      A/G Ratio 1.5 g/dL      BUN/Creatinine Ratio 14.0     Anion Gap 11.8 mmol/L      eGFR 95.2 mL/min/1.73      Comment: National Kidney Foundation and American Society of Nephrology (ASN) Task Force recommended calculation based on the Chronic Kidney Disease Epidemiology Collaboration (CKD-EPI) equation refit without adjustment for race.       Narrative:      GFR Normal >60  Chronic Kidney Disease <60  Kidney Failure <15      CBC Auto Differential [[id number]]  (Normal) Collected: 05/27/22 1642    Specimen: Blood from Arm, Right Updated: 05/27/22 1651     WBC 5.94 10*3/mm3      RBC 5.30 10*6/mm3      Hemoglobin 16.2 g/dL      Hematocrit 45.5 %      MCV  85.8 fL      MCH 30.6 pg      MCHC 35.6 g/dL      RDW 12.4 %      RDW-SD 38.8 fl      MPV 9.7 fL      Platelets 345 10*3/mm3      Neutrophil % 63.3 %      Lymphocyte % 24.7 %      Monocyte % 8.4 %      Eosinophil % 2.4 %      Basophil % 1.0 %      Immature Grans % 0.2 %      Neutrophils, Absolute 3.76 10*3/mm3      Lymphocytes, Absolute 1.47 10*3/mm3      Monocytes, Absolute 0.50 10*3/mm3      Eosinophils, Absolute 0.14 10*3/mm3      Basophils, Absolute 0.06 10*3/mm3      Immature Grans, Absolute 0.01 10*3/mm3      nRBC 0.0 /100 WBC     D-dimer, Quantitative [[id number]]  (Normal) Collected: 05/27/22 1851    Specimen: Blood Updated: 05/27/22 1909     D-Dimer, Quantitative 0.28 MCGFEU/mL     Narrative:      The Stago D-Dimer test used in conjunction with a clinical pretest probability (PTP) assessment model, has been approved by the FDA to rule out the presence of venous thromboembolism (VTE) in outpatients suspected of deep venous thrombosis (DVT) or pulmonary embolism (PE). The cut-off for negative predictive value is <0.50 MCGFEU/mL.           Imaging:  No Radiology Exams Resulted Within Past 24 Hours    Procedures:  Procedures    Progress  ED Course as of 05/27/22 2043   Fri May 27, 2022   1827 EKG interpretation: Normal sinus rhythm, heart rate 91, normal IL and QT intervals, normal QRS duration, normal axis, normal ST segments with no acute ischemia. [RP]      ED Course User Index  [RP] Wayne Schreiber MD           HEART Score: 1                  Medical Decision Making:  MDM  Number of Diagnoses or Management Options     Amount and/or Complexity of Data Reviewed  Clinical lab tests: reviewed  Tests in the medicine section of CPT®: reviewed  Independent visualization of images, tracings, or specimens: yes    Risk of Complications, Morbidity, and/or Mortality  Presenting problems: moderate  Management options: low         Final diagnoses:   Atypical chest pain        Disposition:  ED Disposition     ED  Disposition   Discharge    Condition   Stable    Comment   --             This medical record created using voice recognition software.           Wayne Schreiber MD  05/27/22 2043

## 2022-05-27 NOTE — ED NOTES
Patient complaining of stabbing chest pain located in the lower right of the sternum. Patient states the pain comes and goes, but last for several hours at a time. The patient states the pain has subsided for now.

## 2022-05-27 NOTE — PROGRESS NOTES
Chief Complaint  Pelvic Pain (Left side pelvic pain. Went to  and everything has cleared up since then. ) and Chest Pain (Minor chest pain for a few weeks, he says it comes and goes. He states when he's still and then moves suddenly he will feel the pain. The patient wanted to know if he could get prescribed trivana to prevent getting HIV. )  Subjective        History of Present Illness  Thiago Kellogg is a 46 y.o. male who presents to [facility] for:    1.  Follow-up from ED for complaint of left groin pain for 1 week. Denies any known injury but walked 5 miles on the treadmill day prior to symptoms. He was seen at Urgent Care and treated with warm compresses and Keflex 500 mg BID for 5 days.  He is tolerating the medications prescribed.  Symptoms have resolved.    2.  Complaint of right-sided chest pain that is intermittent for the past 3 weeks.  The chest pain occurs with and without activity.  Positive for heart palpitations, dizziness, heartburn and gastric reflux.  He uses Tums with relief of heartburn.  He walks 5 miles twice a week and has no chest pain or shortness of air during this activity.  The patient reports he has decreased his caffeine use over the past few months and now has 2 caffeinated beverages per week.  He admits to increased anxiety.    3.  The patient is requesting a prescription for Truvada for preventing HIV since he has sexual relations with men.  He denies any recent known exposure.       Review of Systems   Constitutional: Negative for fever, chills, or recent weight loss.  Positive for fatigue.  Musculoskeletal: Negative for arthralgias and back pain.   Respiratory: Negative for shortness of air or dyspnea.  Positive for cough and occasional wheezing.  Cardiovascular: Negative for lower extremity edema or diaphoresis.  Gastrointestinal: Negative for abdominal pain, decreased appetite, nausea, vomiting, or change in bowel habits. Positive for  "reflux.  Genitourinary: Negative for difficulty urinating.    Past Medical History:   Diagnosis Date   • Anxiety    • Asthma, intrinsic c. 2018   • Hypertension February 2022   • Insomnia         History reviewed. No pertinent surgical history.     Allergies   Allergen Reactions   • Erythromycin Unknown - High Severity          Current Outpatient Medications:   •  Albuterol Sulfate, sensor, (ProAir Digihaler) 108 (90 Base) MCG/ACT aerosol powder , Inhale As Needed., Disp: , Rfl:   •  cephalexin (KEFLEX) 500 MG capsule, Take 1 capsule by mouth 2 (Two) Times a Day., Disp: 10 capsule, Rfl: 0  •  clonazePAM (KlonoPIN) 0.5 MG tablet, Take 0.5 mg by mouth At Night As Needed for Anxiety., Disp: , Rfl:   •  traZODone (DESYREL) 100 MG tablet, Take 50 mg by mouth Every Night. PRN, Disp: , Rfl:   •  emtricitabine-tenofovir (Truvada) 200-300 MG per tablet, Take 1 tablet by mouth Daily., Disp: 30 tablet, Rfl: 5    Objective   /93 (BP Location: Left arm, Patient Position: Sitting, Cuff Size: Large Adult)   Pulse 82   Temp 98.2 °F (36.8 °C) (Temporal)   Ht 177.8 cm (70\")   Wt 124 kg (274 lb)   SpO2 99%   BMI 39.31 kg/m²    Estimated body mass index is 39.31 kg/m² as calculated from the following:    Height as of this encounter: 177.8 cm (70\").    Weight as of this encounter: 124 kg (274 lb).   Physical Exam  Vitals reviewed.   Constitutional:       General: He is not in acute distress.     Appearance: Normal appearance.   HENT:      Head: Normocephalic and atraumatic.   Eyes:      Conjunctiva/sclera: Conjunctivae normal.   Cardiovascular:      Rate and Rhythm: Normal rate and regular rhythm.      Heart sounds: Normal heart sounds.   Pulmonary:      Effort: Pulmonary effort is normal.      Breath sounds: No wheezing or rhonchi.   Chest:      Chest wall: No tenderness.   Lymphadenopathy:      Cervical: No cervical adenopathy.   Skin:     General: Skin is warm and dry.   Neurological:      General: No focal deficit " present.      Mental Status: He is alert.   Psychiatric:         Thought Content: Thought content normal.        Result Review :   The following data was reviewed by: ELIANA Cazares on 05/27/2022:    Data reviewed: Urgent Care Provider Note by Johnnie Cotto MD (05/23/2022 17:11)          Assessment and Plan    Diagnoses and all orders for this visit:    1. Chest pain, unspecified type (Primary)  -     Cancel: ECG 12 Lead    2. Left groin pain    3. High risk homosexual behavior    Other orders  -     emtricitabine-tenofovir (Truvada) 200-300 MG per tablet; Take 1 tablet by mouth Daily.  Dispense: 30 tablet; Refill: 5    1.  Chest pain.  Patient advised to go to the ED for chest pain rule out MI.  If he has discharge he is to follow-up with me in for further work-up including Holter monitor, echocardiogram, possible referral to cardiology, etc.  The patient agrees with this plan.    2.  Left groin pain resolved.    3.  The patient wishes to start Truvada prophylactic for high risk homosexual behavior.  He denies any known HIV exposure.  Education provided.    Follow Up     Patient was given instructions and counseling regarding his condition. Please see specific information pulled into the AVS if appropriate.   Return if symptoms worsen or fail to improve, for Next scheduled follow up.    ELIANA Cazares

## 2022-06-01 ENCOUNTER — TELEPHONE (OUTPATIENT)
Dept: INTERNAL MEDICINE | Facility: CLINIC | Age: 47
End: 2022-06-01

## 2022-06-01 NOTE — TELEPHONE ENCOUNTER
Caller: SARABJIT    Relationship: Pharmacy    Best call back number: [phone]    SARABJIT WITH [facility]'S BILLING DEPARTMENT STATED SHE IS NEEDING TO VERIFY THAT FAX HAS BEEN RECEIVED FOR PRIOR AUTHORIZATION OF THE MEDICATION EMITRICITABINE 200/300 MG    Omar SAUNDERS Rep   06/01/22 14:58 EDT

## 2022-06-03 ENCOUNTER — OFFICE VISIT (OUTPATIENT)
Dept: INTERNAL MEDICINE | Facility: CLINIC | Age: 47
End: 2022-06-03

## 2022-06-03 VITALS
DIASTOLIC BLOOD PRESSURE: 87 MMHG | HEART RATE: 86 BPM | BODY MASS INDEX: 39.2 KG/M2 | TEMPERATURE: 98.2 F | WEIGHT: 273.8 LBS | HEIGHT: 70 IN | OXYGEN SATURATION: 98 % | SYSTOLIC BLOOD PRESSURE: 126 MMHG

## 2022-06-03 DIAGNOSIS — R00.2 HEART PALPITATIONS: ICD-10-CM

## 2022-06-03 DIAGNOSIS — R07.9 CHEST PAIN, UNSPECIFIED TYPE: Primary | ICD-10-CM

## 2022-06-03 PROBLEM — J45.909 ASTHMA: Status: ACTIVE | Noted: 2022-06-03

## 2022-06-03 PROBLEM — F41.9 ANXIETY: Status: ACTIVE | Noted: 2022-06-03

## 2022-06-03 PROBLEM — R91.8 GROUND GLASS OPACITY PRESENT ON IMAGING OF LUNG: Status: ACTIVE | Noted: 2022-06-03

## 2022-06-03 PROCEDURE — 99214 OFFICE O/P EST MOD 30 MIN: CPT | Performed by: NURSE PRACTITIONER

## 2022-06-03 NOTE — PROGRESS NOTES
Chief Complaint  ER follow up (Symptoms are still preset. )  Subjective        History of Present Illness  Thiago Kellogg is a 46 y.o. male who presents to [facility] for 1 week follow-up of emergency department visit for chest pain.  He continues to have chest pain on the right side of his sternum that is intermittent even at rest.  Nausea has improved.  Heart palpitations have improved.  Previous CT showed patchy opacities in the right upper lobe.    Review of Systems   Constitutional: Negative for chills and fever.   HENT: Negative for congestion, rhinorrhea and sore throat.    Eyes: Negative for pain and visual disturbance.   Respiratory: Negative for apnea, cough, chest tightness and shortness of breath.    Cardiovascular: Positive for chest pain  right side of the sternum intermittent.  Gastrointestinal: Negative for  nausea, vomiting, abdominal pain, or diarrhea.  Genitourinary: Negative for difficulty urinating and dysuria.   Musculoskeletal: Negative for joint swelling and myalgias.   Skin: Negative for color change.   Neurological: Negative for seizures, headaches, or dizziness.    Past Medical History:   Diagnosis Date   • Anxiety    • Asthma, intrinsic c. 2018   • Hypertension February 2022   • Insomnia         History reviewed. No pertinent surgical history.     Allergies   Allergen Reactions   • Erythromycin Unknown - High Severity          Current Outpatient Medications:   •  Albuterol Sulfate, sensor, (ProAir Digihaler) 108 (90 Base) MCG/ACT aerosol powder , Inhale As Needed., Disp: , Rfl:   •  cephalexin (KEFLEX) 500 MG capsule, Take 1 capsule by mouth 2 (Two) Times a Day., Disp: 10 capsule, Rfl: 0  •  clonazePAM (KlonoPIN) 0.5 MG tablet, Take 0.5 mg by mouth At Night As Needed for Anxiety., Disp: , Rfl:   •  emtricitabine-tenofovir (Truvada) 200-300 MG per tablet, Take 1 tablet by mouth Daily., Disp: 30 tablet, Rfl: 5  •  traZODone (DESYREL) 100 MG tablet, Take  "50 mg by mouth Every Night. PRN, Disp: , Rfl:     Objective   /87 (BP Location: Left arm, Patient Position: Sitting, Cuff Size: Large Adult)   Pulse 86   Temp 98.2 °F (36.8 °C) (Temporal)   Ht 177.8 cm (70\")   Wt 124 kg (273 lb 12.8 oz)   SpO2 98%   BMI 39.29 kg/m²    Estimated body mass index is 39.29 kg/m² as calculated from the following:    Height as of this encounter: 177.8 cm (70\").    Weight as of this encounter: 124 kg (273 lb 12.8 oz).   Physical Exam  Vitals reviewed.   Constitutional:       General: He is not in acute distress.     Appearance: Normal appearance.   HENT:      Head: Normocephalic and atraumatic.      Right Ear: External ear normal.      Left Ear: External ear normal.      Nose: Nose normal.      Mouth/Throat:      Mouth: Mucous membranes are moist.      Pharynx: Oropharynx is clear.   Eyes:      Conjunctiva/sclera: Conjunctivae normal.   Cardiovascular:      Rate and Rhythm: Normal rate and regular rhythm.      Pulses: Normal pulses.      Heart sounds: Normal heart sounds.   Pulmonary:      Effort: Pulmonary effort is normal.      Breath sounds: Normal breath sounds. No wheezing or rhonchi.   Chest:      Chest wall: No tenderness.   Abdominal:      General: Bowel sounds are normal.      Palpations: Abdomen is soft.   Musculoskeletal:         General: Normal range of motion.      Cervical back: Normal range of motion and neck supple.   Lymphadenopathy:      Cervical: No cervical adenopathy.   Skin:     General: Skin is warm and dry.      Capillary Refill: Capillary refill takes less than 2 seconds.   Neurological:      General: No focal deficit present.      Mental Status: He is alert.   Psychiatric:         Mood and Affect: Mood normal.         Behavior: Behavior normal.         Thought Content: Thought content normal.        Result Review :   The following data was reviewed by: ELIANA Cazares on 06/03/2022:  CMP    CMP 2/22/22 5/27/22   Glucose 79 111 (A)   BUN 12 14 "   Creatinine 1.01 1.00   eGFR Non African Am 80    eGFR African Am 97    Sodium 137 137   Potassium 4.5 4.5   Chloride 100 100   Calcium 9.7 10.2   Albumin 4.60 4.80   Total Bilirubin 0.7 0.5   Alkaline Phosphatase 82 102   AST (SGOT) 16 20   ALT (SGPT) 20 26   (A) Abnormal value            CBC w/diff    CBC w/Diff 2/22/22 5/27/22   WBC 10.26 5.94   RBC 5.15 5.30   Hemoglobin 15.5 16.2   Hematocrit 46.6 45.5   MCV 90.5 85.8   MCH 30.1 30.6   MCHC 33.3 35.6   RDW 13.6 12.4   Platelets 329 345   Neutrophil Rel % 75.5 63.3   Immature Granulocyte Rel % 0.3 0.2   Lymphocyte Rel % 15.7 (A) 24.7   Monocyte Rel % 6.2 8.4   Eosinophil Rel % 1.7 2.4   Basophil Rel % 0.6 1.0   (A) Abnormal value          D-dimer, Quantitative (05/27/2022 18:51)  Troponin (05/27/2022 16:42)    Data reviewed: ED Provider Notes by Wayne Schreiber MD (05/27/2022 18:37)            Assessment and Plan    Diagnoses and all orders for this visit:    1. Chest pain, unspecified type (Primary)  -     Holter monitor - 48 hour; Future  -     Adult Transthoracic Echo Complete W/ Cont if Necessary Per Protocol; Future    2. Heart palpitations  -     Holter monitor - 48 hour; Future  -     Adult Transthoracic Echo Complete W/ Cont if Necessary Per Protocol; Future    Differential diagnosis discussed. Take trazodone every night instead of as needed.  Appointment with behavioral health for anxiety on June 9, 2022.  We will do Holter monitor and echocardiogram.    Follow Up     Patient was given instructions and counseling regarding his condition. Please see specific information pulled into the AVS if appropriate.   Return if symptoms worsen or fail to improve.    ELIANA Cazares

## 2022-06-04 LAB — QT INTERVAL: 347 MS

## 2022-06-08 DIAGNOSIS — R91.8 GROUND GLASS OPACITY PRESENT ON IMAGING OF LUNG: ICD-10-CM

## 2022-06-08 DIAGNOSIS — R07.9 CHEST PAIN, UNSPECIFIED TYPE: Primary | ICD-10-CM

## 2022-06-09 ENCOUNTER — OFFICE VISIT (OUTPATIENT)
Dept: PSYCHIATRY | Facility: CLINIC | Age: 47
End: 2022-06-09

## 2022-06-09 VITALS
HEIGHT: 70 IN | DIASTOLIC BLOOD PRESSURE: 82 MMHG | BODY MASS INDEX: 39.37 KG/M2 | SYSTOLIC BLOOD PRESSURE: 125 MMHG | WEIGHT: 275 LBS

## 2022-06-09 DIAGNOSIS — F33.1 MAJOR DEPRESSIVE DISORDER, RECURRENT EPISODE, MODERATE: Primary | ICD-10-CM

## 2022-06-09 DIAGNOSIS — F51.05 INSOMNIA DUE TO MENTAL DISORDER: ICD-10-CM

## 2022-06-09 DIAGNOSIS — F41.1 GENERALIZED ANXIETY DISORDER: ICD-10-CM

## 2022-06-09 PROCEDURE — 90792 PSYCH DIAG EVAL W/MED SRVCS: CPT | Performed by: NURSE PRACTITIONER

## 2022-06-09 RX ORDER — VENLAFAXINE HYDROCHLORIDE 37.5 MG/1
37.5 CAPSULE, EXTENDED RELEASE ORAL DAILY
Qty: 30 CAPSULE | Refills: 1 | Status: SHIPPED | OUTPATIENT
Start: 2022-06-09 | End: 2022-06-23 | Stop reason: SDUPTHER
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
## 2022-06-14 ENCOUNTER — APPOINTMENT (OUTPATIENT)
Dept: CT IMAGING | Facility: HOSPITAL | Age: 47
End: 2022-06-14

## 2022-06-14 ENCOUNTER — HOSPITAL ENCOUNTER (OUTPATIENT)
Dept: CARDIOLOGY | Facility: HOSPITAL | Age: 47
Discharge: HOME | End: 2022-06-14

## 2022-06-14 DIAGNOSIS — R07.9 CHEST PAIN, UNSPECIFIED TYPE: ICD-10-CM

## 2022-06-14 DIAGNOSIS — R00.2 HEART PALPITATIONS: ICD-10-CM

## 2022-06-20 ENCOUNTER — APPOINTMENT (OUTPATIENT)
Dept: CT IMAGING | Facility: HOSPITAL | Age: 47
End: 2022-06-20

## 2022-06-23 ENCOUNTER — TELEPHONE (OUTPATIENT)
Dept: PSYCHIATRY | Facility: CLINIC | Age: 47
End: 2022-06-23

## 2022-06-23 DIAGNOSIS — F33.1 MAJOR DEPRESSIVE DISORDER, RECURRENT EPISODE, MODERATE: ICD-10-CM

## 2022-06-23 LAB
MAXIMAL PREDICTED HEART RATE: 176 BPM
STRESS TARGET HR: 150 BPM

## 2022-06-23 RX ORDER — VENLAFAXINE HYDROCHLORIDE 37.5 MG/1
37.5 CAPSULE, EXTENDED RELEASE ORAL DAILY
Qty: 30 CAPSULE | Refills: 1 | Status: SHIPPED | OUTPATIENT
Start: 2022-06-23 | End: 2022-07-14 | Stop reason: SDUPTHER

## 2022-06-23 NOTE — TELEPHONE ENCOUNTER
Pt brought Venlafaxine bottle.  He has spilled something inside of it and all the capsules are jelly.  He wants to know if he can get it represcribed

## 2022-06-28 ENCOUNTER — HOSPITAL ENCOUNTER (OUTPATIENT)
Dept: CARDIOLOGY | Facility: HOSPITAL | Age: 47
Discharge: HOME | End: 2022-06-28
Admitting: NURSE PRACTITIONER

## 2022-06-28 DIAGNOSIS — R00.2 HEART PALPITATIONS: ICD-10-CM

## 2022-06-28 DIAGNOSIS — R07.9 CHEST PAIN, UNSPECIFIED TYPE: ICD-10-CM

## 2022-06-28 LAB
BH CV ECHO MEAS - AO ROOT DIAM: 2.5 CM
BH CV ECHO MEAS - EF(MOD-BP): 58.8 %
BH CV ECHO MEAS - IVSD: 1.1 CM
BH CV ECHO MEAS - LA DIMENSION: 4 CM
BH CV ECHO MEAS - LAT PEAK E' VEL: 12.4 CM/SEC
BH CV ECHO MEAS - LVIDD: 4.3 CM
BH CV ECHO MEAS - LVIDS: 3.1 CM
BH CV ECHO MEAS - LVOT DIAM: 2.3 CM
BH CV ECHO MEAS - LVPWD: 1.1 CM
BH CV ECHO MEAS - MED PEAK E' VEL: 8.59 CM/SEC
BH CV ECHO MEAS - MV A MAX VEL: 94.3 CM/SEC
BH CV ECHO MEAS - MV DEC TIME: 245 MSEC
BH CV ECHO MEAS - MV E MAX VEL: 79.1 CM/SEC
BH CV ECHO MEAS - MV E/A: 0.8
BH CV ECHO MEAS - RVDD: 3.1 CM
BH CV ECHO MEAS - TAPSE (>1.6): 2.7 CM
BH CV ECHO MEAS - TR MAX PG: 20 MMHG
BH CV ECHO MEAS - TR MAX VEL: 223 CM/SEC
BH CV ECHO MEASUREMENTS AVERAGE E/E' RATIO: 7.54
IVRT: 88 MSEC
LEFT ATRIUM VOLUME INDEX: 14.8 ML/M2
MAXIMAL PREDICTED HEART RATE: 176 BPM
STRESS TARGET HR: 150 BPM

## 2022-06-28 PROCEDURE — 93306 TTE W/DOPPLER COMPLETE: CPT

## 2022-06-29 DIAGNOSIS — R07.9 CHEST PAIN, UNSPECIFIED TYPE: Primary | ICD-10-CM

## 2022-07-01 ENCOUNTER — HOSPITAL ENCOUNTER (OUTPATIENT)
Dept: CT IMAGING | Facility: HOSPITAL | Age: 47
Discharge: HOME | End: 2022-07-01

## 2022-07-01 DIAGNOSIS — R91.8 GROUND GLASS OPACITY PRESENT ON IMAGING OF LUNG: ICD-10-CM

## 2022-07-01 DIAGNOSIS — R07.9 CHEST PAIN, UNSPECIFIED TYPE: ICD-10-CM

## 2022-07-06 DIAGNOSIS — Z87.891 STOPPED SMOKING WITH GREATER THAN 25 PACK YEAR HISTORY: Primary | ICD-10-CM

## 2022-07-13 ENCOUNTER — TELEPHONE (OUTPATIENT)
Dept: INTERNAL MEDICINE | Facility: CLINIC | Age: 47
End: 2022-07-13

## 2022-07-13 NOTE — TELEPHONE ENCOUNTER
COREY FROM [facility] CALLED. LONG STORY SHORT, PATIENT WAS ORDERED CT SCAN OF CHEST. PATIENT DENIED ORIGINAL CT SCAN, REQUESTING LOW DOSE CT SCAN. COREY STATES ORIGINAL ORDER HAS A PRIOR AUTH THAT IS GOOD UNTIL AUG  26.  PATIENT DECLINED THIS SCAN BUT WISHED TO PAY OUT OF POCKET FOR A LOW DOSE SCAN. HOWEVER, IN CHECKING WITH HER SUPERVISOR, COREY STATES MEDICAID PATIENTS CANNOT BE SELF-PAY FOR SERVICES. ALSO, THE PATIENT DOES NOT MEET AGE AND OTHER CRITERIA FOR LOW DOSE CT SCAN. COREY STATED THEY WOULD REACH OUT TO PATIENT TO RE-INSTATE THE ORIGINAL CT SCAN CHEST IF HE WISHED AND IF JOSE RAFAEL OLEA WAS OK WITH THAT. COREY STATES THE PATIENT DECLINED ORIGINAL CT SCAN FOR NOW AND IS WANTING TO DISCUSS THIS WITH SOMEONE IN OUR OFFICE. COREY DID CANCEL THE ORIGINAL CT SCAN. SO AS IT STANDS, THE ORIGINAL ORDER FOR CT CHEST WAS CANCELLED UNTIL FURTHER NOTICE AND/OR CALL FROM PATIENT.      COREY [phone]

## 2022-07-14 ENCOUNTER — OFFICE VISIT (OUTPATIENT)
Dept: PSYCHIATRY | Facility: CLINIC | Age: 47
End: 2022-07-14

## 2022-07-14 VITALS
BODY MASS INDEX: 39.4 KG/M2 | SYSTOLIC BLOOD PRESSURE: 104 MMHG | WEIGHT: 275.2 LBS | HEIGHT: 70 IN | DIASTOLIC BLOOD PRESSURE: 85 MMHG

## 2022-07-14 DIAGNOSIS — F33.1 MAJOR DEPRESSIVE DISORDER, RECURRENT EPISODE, MODERATE: ICD-10-CM

## 2022-07-14 DIAGNOSIS — F51.05 INSOMNIA DUE TO MENTAL DISORDER: ICD-10-CM

## 2022-07-14 DIAGNOSIS — F41.1 GENERALIZED ANXIETY DISORDER: Primary | ICD-10-CM

## 2022-07-14 PROCEDURE — 90833 PSYTX W PT W E/M 30 MIN: CPT | Performed by: NURSE PRACTITIONER

## 2022-07-14 PROCEDURE — 99214 OFFICE O/P EST MOD 30 MIN: CPT | Performed by: NURSE PRACTITIONER

## 2022-07-14 RX ORDER — CHLORHEXIDINE GLUCONATE 0.12 MG/ML
RINSE ORAL
COMMUNITY
Start: 2022-06-23 | End: 2022-07-21

## 2022-07-14 RX ORDER — HYDROCODONE BITARTRATE AND ACETAMINOPHEN 5; 325 MG/1; MG/1
1 TABLET ORAL EVERY 4 HOURS PRN
COMMUNITY
Start: 2022-06-21 | End: 2022-07-21

## 2022-07-14 RX ORDER — VENLAFAXINE HYDROCHLORIDE 75 MG/1
75 CAPSULE, EXTENDED RELEASE ORAL DAILY
Qty: 30 CAPSULE | Refills: 2 | Status: SHIPPED | OUTPATIENT
Start: 2022-07-14 | End: 2022-09-22 | Stop reason: SDUPTHER

## 2022-07-14 RX ORDER — AMOXICILLIN 500 MG/1
CAPSULE ORAL
COMMUNITY
Start: 2022-06-21 | End: 2022-07-14

## 2022-07-14 RX ORDER — IBUPROFEN 600 MG/1
600 TABLET ORAL EVERY 6 HOURS PRN
COMMUNITY
Start: 2022-06-21 | End: 2022-07-14
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
## 2022-07-14 NOTE — TREATMENT PLAN
Multi-Disciplinary Problems (from Behavioral Health Treatment Plan)    Active Problems     Problem: Anxiety  Start Date: 07/14/22    Problem Details: The patient self-scales this problem as a 4 with 10 being the worst.        Goal Priority Start Date Expected End Date End Date    Patient will develop and implement behavioral and cognitive strategies to reduce anxiety and irrational fears. -- 07/14/22 -- --    Goal Details: Progress toward goal:  Not appropriate to rate progress toward goal since this is the initial treatment plan.        Goal Intervention Frequency Start Date End Date    Help patient explore past emotional issues in relation to present anxiety. Weekly 07/14/22 --    Intervention Details: Duration of treatment until until remission of symptoms.        Goal Intervention Frequency Start Date End Date    Help patient develop an awareness of their cognitive and physical responses to anxiety. Weekly 07/14/22 --    Intervention Details: Duration of treatment until until remission of symptoms.                    Reviewed By     Gustavo Jeffers APRN 07/14/22 1905                 I have discussed and reviewed this treatment plan with the patient.

## 2022-07-18 ENCOUNTER — APPOINTMENT (OUTPATIENT)
Dept: CT IMAGING | Facility: HOSPITAL | Age: 47
End: 2022-07-18

## 2022-07-21 ENCOUNTER — OFFICE VISIT (OUTPATIENT)
Dept: CARDIOLOGY | Facility: CLINIC | Age: 47
End: 2022-07-21

## 2022-07-21 VITALS
BODY MASS INDEX: 37.22 KG/M2 | HEIGHT: 70 IN | HEART RATE: 88 BPM | DIASTOLIC BLOOD PRESSURE: 91 MMHG | SYSTOLIC BLOOD PRESSURE: 125 MMHG | WEIGHT: 260 LBS

## 2022-07-21 DIAGNOSIS — R07.89 CHEST PAIN, ATYPICAL: Primary | ICD-10-CM

## 2022-07-21 PROCEDURE — 99203 OFFICE O/P NEW LOW 30 MIN: CPT | Performed by: INTERNAL MEDICINE

## 2022-07-21 NOTE — PROGRESS NOTES
Chief Complaint  Chest Pain      History of Present Illness  Thiago Kellogg presents to [facility] CARDIOLOGY    This is a very pleasant 46 gentleman was referred to clinic for cardiac evaluation.  He has been having sporadic episodes of retrosternal chest discomfort which is sharp in nature.  Started about 3 to 4 months ago.  He denies aggravating or relieving factors.  It varies in duration but usually last few minutes.  He denies associated symptoms.  He was started on Effexor for anxiety which seems to have helped but his discomfort did not completely resolve.  He has no other complaints.  He has no shortness of breath, orthopnea, edema, palpitations, presyncope or syncope        Past Medical History:   Diagnosis Date   • Alcohol abuse    • Anxiety    • Asthma, intrinsic c.    • Depression    • Hypertension 2022   • Insomnia    • Substance abuse (HCC)          Current Outpatient Medications:   •  Albuterol Sulfate, sensor, (ProAir Digihaler) 108 (90 Base) MCG/ACT aerosol powder , Inhale As Needed., Disp: , Rfl:   •  emtricitabine-tenofovir (Truvada) 200-300 MG per tablet, Take 1 tablet by mouth Daily., Disp: 30 tablet, Rfl: 5  •  traZODone (DESYREL) 100 MG tablet, Take 50 mg by mouth Every Night. PRN, Disp: , Rfl:   •  venlafaxine XR (Effexor XR) 75 MG 24 hr capsule, Take 1 capsule by mouth Daily for 90 days., Disp: 30 capsule, Rfl: 2    Medications Discontinued During This Encounter   Medication Reason   • chlorhexidine (PERIDEX) 0.12 % solution *Therapy completed   • HYDROcodone-acetaminophen (NORCO) 5-325 MG per tablet *Therapy completed     Allergies   Allergen Reactions   • Erythromycin Unknown - High Severity        Social History     Tobacco Use   • Smoking status: Former Smoker     Packs/day: 1.00     Years: 25.00     Pack years: 25.00     Types: Cigarettes, Cigarettes, Cigarettes     Start date: 1993     Quit date: 2018     Years since quittin.1  "  • Smokeless tobacco: Never Used   • Tobacco comment: Smoked 1 pack a day for first 15 years, half a pack for last 10 years of smoking.   Vaping Use   • Vaping Use: Former   • Substances: Nicotine, Flavoring   • Devices: Pre-filled or refillable cartridge   Substance Use Topics   • Alcohol use: Yes     Alcohol/week: 3.0 standard drinks     Types: 3 Cans of beer per week     Comment: socially   • Drug use: Not Currently     Frequency: 5.0 times per week     Types: Marijuana     Comment: Used to use marijuana 5 times per week, off and on last 25 y       Family History   Problem Relation Age of Onset   • Cancer Mother    • Anxiety disorder Mother    • Cancer Paternal Grandfather    • OCD Paternal Grandmother         Codier        Objective     /91   Pulse 88   Ht 177.8 cm (70\")   Wt 118 kg (260 lb)   BMI 37.31 kg/m²       Physical Exam  Constitutional:       General: Awake. Not in acute distress.     Appearance: Normal appearance.   Neck:      Vascular: No carotid bruit, hepatojugular reflux or JVD.   Cardiovascular:      Rate and Rhythm: Normal rate and regular rhythm.      Chest Wall: PMI is not displaced.      Heart sounds: Normal heart sounds, S1 normal and S2 normal. No murmur heard.   No friction rub. No gallop. No S3 or S4 sounds.    Pulmonary:      Effort: Pulmonary effort is normal.      Breath sounds: Normal breath sounds. No wheezing, rhonchi or rales.   Ext.      Right lower leg: No edema.      Left lower leg: No edema.   Skin:     General: Skin is warm and dry.      Coloration: Skin is not cyanotic.      Findings: No petechiae or rash.   Neurological:      Mental Status: Alert and oriented x 3  Psychiatric:         Behavior: Behavior is cooperative.       Result Review :     No results found for: PROBNP  CMP    CMP 2/22/22 5/27/22   Glucose 79 111 (A)   BUN 12 14   Creatinine 1.01 1.00   eGFR Non African Am 80    eGFR African Am 97    Sodium 137 137   Potassium 4.5 4.5   Chloride 100 100 "   Calcium 9.7 10.2   Albumin 4.60 4.80   Total Bilirubin 0.7 0.5   Alkaline Phosphatase 82 102   AST (SGOT) 16 20   ALT (SGPT) 20 26   (A) Abnormal value            CBC w/diff    CBC w/Diff 2/22/22 5/27/22   WBC 10.26 5.94   RBC 5.15 5.30   Hemoglobin 15.5 16.2   Hematocrit 46.6 45.5   MCV 90.5 85.8   MCH 30.1 30.6   MCHC 33.3 35.6   RDW 13.6 12.4   Platelets 329 345   Neutrophil Rel % 75.5 63.3   Immature Granulocyte Rel % 0.3 0.2   Lymphocyte Rel % 15.7 (A) 24.7   Monocyte Rel % 6.2 8.4   Eosinophil Rel % 1.7 2.4   Basophil Rel % 0.6 1.0   (A) Abnormal value             Lab Results   Component Value Date    TSH 1.710 02/22/2022      Lab Results   Component Value Date    FREET4 1.46 02/22/2022      D-Dimer, Quantitative   Date Value Ref Range Status   05/27/2022 0.28 0.00 - 0.57 MCGFEU/mL Final     No results found for: MG   No results found for: DIGOXIN   Lab Results   Component Value Date    TROPONINT <0.010 05/27/2022      No results found for: POCTROP(       Lipid Panel    Lipid Panel 2/22/22   Total Cholesterol 206 (A)   Triglycerides 92   HDL Cholesterol 48   VLDL Cholesterol 17   LDL Cholesterol  141 (A)   LDL/HDL Ratio 2.91   (A) Abnormal value            Results for orders placed during the hospital encounter of 06/28/22    Adult Transthoracic Echo Complete W/ Cont if Necessary Per Protocol    Interpretation Summary  · Left ventricular wall thickness is consistent with mild concentric hypertrophy.  · Calculated left ventricular EF = 58.8% Estimated left ventricular EF was in agreement with the calculated left ventricular EF. Left ventricular systolic function is normal.  · Estimated right ventricular systolic pressure from tricuspid regurgitation is normal (<35 mmHg).         Results for orders placed during the hospital encounter of 06/28/22    Adult Transthoracic Echo Complete W/ Cont if Necessary Per Protocol    Interpretation Summary  · Left ventricular wall thickness is consistent with mild concentric  hypertrophy.  · Calculated left ventricular EF = 58.8% Estimated left ventricular EF was in agreement with the calculated left ventricular EF. Left ventricular systolic function is normal.  · Estimated right ventricular systolic pressure from tricuspid regurgitation is normal (<35 mmHg).     No results found for this or any previous visit.                Diagnoses and all orders for this visit:    1. Chest pain, atypical (Primary)  -     Treadmill Stress Test; Future      Assessment: Patient has been having sporadic episodes of atypical chest discomfort as described above.  His exam is benign.  ECG shows normal sinus rhythm.  Recent echo was benign.  He has an intermediate pretest probability for CAD.  He will be scheduled for treadmill stress test to assess for ischemic ST-T changes.  Further recommendations to follow    Follow Up       No follow-ups on file.    Patient was given instructions and counseling regarding his condition or for health maintenance advice. Please see specific information pulled into the AVS if appropriate.

## 2022-08-05 ENCOUNTER — OFFICE VISIT (OUTPATIENT)
Dept: PSYCHIATRY | Facility: CLINIC | Age: 47
End: 2022-08-05

## 2022-08-05 DIAGNOSIS — F41.1 GENERALIZED ANXIETY DISORDER: Primary | ICD-10-CM

## 2022-08-05 DIAGNOSIS — F33.1 MAJOR DEPRESSIVE DISORDER, RECURRENT EPISODE, MODERATE: ICD-10-CM

## 2022-08-05 PROCEDURE — 90837 PSYTX W PT 60 MINUTES: CPT | Performed by: SOCIAL WORKER

## 2022-08-05 NOTE — PSYCHOTHERAPY NOTE
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  Having to put on a facade  Everyone else is able to compartmentalize all of the other components    [place] - in [place] 1 year  I needed to get out of [place] for financial reasons  Friend in [place] that I met online  $70K in debt  Wasn't working since COVID happened - sold my house    After about 7 years in business, starting     House - lives by self  Owns it - money left over from the house  No mortgage    Rented apartment  Living off savings - feeling the pressure that it's unsustainable    Last worked in 2019 - in [place]  Worked for my parents for 7 years (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[employer] - in the office)  I don't think I could have worked there for very long if not family    I want to get back into working and participating in society  Making net worth     SOCIAL SUPPORT  I don't have a lot of friends  Friend online - male (still close)  Older friends in different eras of my life - not many of those either   The kind of people I've met in the past were dysfunctional in their own ways    Nikolas - 25 years friendships - platonic friends - he lives in [place]  4-5 months ago  I texted him a couple of more times after that and he ghosted you  Friendships end suddenly     Jean-Paul - online - dating now, getting along well - works on [employer]  I attended their training and didn't like it (about 6 months ago)  Was experiencing anxiety in the environment    I seem to get along better with people with higher education  I don't get  along with people who have lived their lives in a vanilla way    A new start    My father and mother live in [place]  Mom and dad  when I was 13 years old  Mom  to step-dad for 30 years (didn't get along when I was younger)  As time passed, I came to respect him - I was glad things worked out the way they did    DIAGNOSIS  Generalized anxiety disorder  Perhaps bipolar disorder    To settle into society - whether here or elsewhere  I was an outcast in [place] too  Live a normal life    Wants to sleep normally  I sleep 8-10 hrs/day - some nights 3-4 hours - still tired, thoughts racing, hard to sleep  I usually go to bed at 5am and 2pm     On the night before an appointment, tries to sleep, but can't sleep at all (awake sleep feeling)  Took trazodone to help sleep (150mg last night - more tired, but not asleep)    Sober for a couple of months  No meetings, no rehab  Stopped alcohol because I didn't want negative reaction to the Effexor  THC quit a month before that - wanting to apply for some jobs    Varied over the last 20 years - drank every night for several years  Sometimes a few nights per week  6 months ago - 1 pint a day of whiskey   Years past - drank until passing out    Effexor has decoupled my mind from the thoughts of getting high or getting a drink    No other history of other drugs  Mom has never abused substances    Dad abuses alcohol  Maternal & paternal grandmother abuse alcohol    One step-sister not close  Cousin - Cony (mom's sister's daughter) - she's my age - close with her until age 12    1x/week to mom - dad (estranged from him) - text every now and then - he's 70+ now and doesn't drink anymore    Since about 20, I knew something was wrong  Went to treatment - didn't take it seriously  Been prescribed a bunch of different medications    When I go to gym - afterwards, I feel really good  If I don't go to the gym for a few days  ps5 - video games - enjoying  it    Worthlessness, lack of motivation, feeling tired  Sometimes I feel like I am capable, but I have self-doubt    Lack of motivation to put in the application  If they do hire me, I have to interact with people    Last went to [employer] - has a workout joe on my phone

## 2022-08-18 ENCOUNTER — OFFICE VISIT (OUTPATIENT)
Dept: PSYCHIATRY | Facility: CLINIC | Age: 47
End: 2022-08-18

## 2022-08-18 VITALS
DIASTOLIC BLOOD PRESSURE: 55 MMHG | HEIGHT: 70 IN | SYSTOLIC BLOOD PRESSURE: 116 MMHG | BODY MASS INDEX: 40.14 KG/M2 | WEIGHT: 280.41 LBS

## 2022-08-18 DIAGNOSIS — F51.05 INSOMNIA DUE TO MENTAL DISORDER: ICD-10-CM

## 2022-08-18 DIAGNOSIS — F41.1 GENERALIZED ANXIETY DISORDER: Primary | ICD-10-CM

## 2022-08-18 DIAGNOSIS — F33.1 MAJOR DEPRESSIVE DISORDER, RECURRENT EPISODE, MODERATE: ICD-10-CM

## 2022-08-18 PROCEDURE — 90833 PSYTX W PT W E/M 30 MIN: CPT | Performed by: NURSE PRACTITIONER

## 2022-08-18 PROCEDURE — 99214 OFFICE O/P EST MOD 30 MIN: CPT | Performed by: NURSE PRACTITIONER

## 2022-08-18 RX ORDER — VENLAFAXINE HYDROCHLORIDE 37.5 MG/1
37.5 CAPSULE, EXTENDED RELEASE ORAL DAILY
Qty: 30 CAPSULE | Refills: 2 | Status: SHIPPED | OUTPATIENT
Start: 2022-08-18 | End: 2022-09-22 | Stop reason: SDUPTHER

## 2022-08-18 NOTE — TREATMENT PLAN
Multi-Disciplinary Problems (from Behavioral Health Treatment Plan)    Active Problems     Problem: Anxiety  Start Date: 08/18/22    Problem Details:         Goal Priority Start Date Expected End Date End Date    Patient will develop and implement behavioral and cognitive strategies to reduce anxiety and irrational fears. -- 08/18/22 -- --    Goal Details: Progress toward goal:  Not appropriate to rate progress toward goal since this is the initial treatment plan.        Goal Intervention Frequency Start Date End Date    Help patient explore past emotional issues in relation to present anxiety. Weekly 08/18/22 --    Intervention Details: Duration of treatment until until remission of symptoms.        Goal Intervention Frequency Start Date End Date    Help patient develop an awareness of their cognitive and physical responses to anxiety. Weekly 08/18/22 --    Intervention Details: Duration of treatment until until remission of symptoms.                    Reviewed By     Gustavo Jeffers APRN 08/18/22 [id number]                 I have discussed and reviewed this treatment plan with the patient.
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
## 2022-09-22 ENCOUNTER — OFFICE VISIT (OUTPATIENT)
Dept: PSYCHIATRY | Facility: CLINIC | Age: 47
End: 2022-09-22

## 2022-09-22 VITALS
DIASTOLIC BLOOD PRESSURE: 79 MMHG | BODY MASS INDEX: 40.8 KG/M2 | SYSTOLIC BLOOD PRESSURE: 127 MMHG | WEIGHT: 285 LBS | HEIGHT: 70 IN

## 2022-09-22 DIAGNOSIS — F41.1 GENERALIZED ANXIETY DISORDER: Primary | ICD-10-CM

## 2022-09-22 DIAGNOSIS — F51.05 INSOMNIA DUE TO MENTAL DISORDER: ICD-10-CM

## 2022-09-22 DIAGNOSIS — F33.1 MAJOR DEPRESSIVE DISORDER, RECURRENT EPISODE, MODERATE: ICD-10-CM

## 2022-09-22 PROCEDURE — 99214 OFFICE O/P EST MOD 30 MIN: CPT | Performed by: NURSE PRACTITIONER

## 2022-09-22 PROCEDURE — 90833 PSYTX W PT W E/M 30 MIN: CPT | Performed by: NURSE PRACTITIONER

## 2022-09-22 RX ORDER — VENLAFAXINE HYDROCHLORIDE 37.5 MG/1
CAPSULE, EXTENDED RELEASE ORAL
Qty: 45 CAPSULE | Refills: 0 | Status: SHIPPED | OUTPATIENT
Start: 2022-09-22 | End: 2022-10-06 | Stop reason: SDUPTHER

## 2022-09-22 RX ORDER — VENLAFAXINE HYDROCHLORIDE 37.5 MG/1
37.5 CAPSULE, EXTENDED RELEASE ORAL DAILY
Qty: 30 CAPSULE | Refills: 0 | Status: SHIPPED | OUTPATIENT
Start: 2022-09-22 | End: 2022-09-22 | Stop reason: SDUPTHER

## 2022-09-22 RX ORDER — VENLAFAXINE HYDROCHLORIDE 75 MG/1
75 CAPSULE, EXTENDED RELEASE ORAL DAILY
Qty: 30 CAPSULE | Refills: 0 | Status: SHIPPED | OUTPATIENT
Start: 2022-09-22 | End: 2022-09-22
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
## 2022-09-22 NOTE — TREATMENT PLAN
Multi-Disciplinary Problems (from Behavioral Health Treatment Plan)    Active Problems     Problem: Depression  Start Date: 09/22/22    Problem Details: The patient self-scales this problem as a 3 with 10 being the worst.        Goal Priority Start Date Expected End Date End Date    Patient will demonstrate the ability to initiate new constructive life skills outside of sessions on a consistent basis. -- 09/22/22 -- --    Goal Details: Progress toward goal:  Not appropriate to rate progress toward goal since this is the initial treatment plan.        Goal Intervention Frequency Start Date End Date    Assist patient in setting attainable activities of daily living goals. PRN 09/22/22 --    Goal Intervention Frequency Start Date End Date    Provide education about depression Weekly 09/22/22 --    Intervention Details: Duration of treatment until until remission of symptoms.        Goal Intervention Frequency Start Date End Date    Assist patient in developing healthy coping strategies. Weekly 09/22/22 --    Intervention Details: Duration of treatment until until remission of symptoms.                    Reviewed By     Gustavo Jeffers APRN 09/22/22 [id number]                 I have discussed and reviewed this treatment plan with the patient.

## 2022-10-06 ENCOUNTER — TELEPHONE (OUTPATIENT)
Dept: PSYCHIATRY | Facility: CLINIC | Age: 47
End: 2022-10-06

## 2022-10-06 DIAGNOSIS — F33.1 MAJOR DEPRESSIVE DISORDER, RECURRENT EPISODE, MODERATE: ICD-10-CM

## 2022-10-06 RX ORDER — VENLAFAXINE HYDROCHLORIDE 37.5 MG/1
CAPSULE, EXTENDED RELEASE ORAL
Qty: 12 CAPSULE | Refills: 0 | Status: SHIPPED | OUTPATIENT
Start: 2022-10-06 | End: 2022-10-27

## 2022-10-06 NOTE — TELEPHONE ENCOUNTER
Pt left voicemail to clarify medication instructions from provider at last appt on 09/22/2022. Pt reported he believes he's supposed to increase Trintellix to 10mg and titrate down on Effexor 37.5mg. Pt also reported he is almost out of Effexor and will need additional tablets to complete tapering off. Will call pt back to clarify.

## 2022-10-06 NOTE — TELEPHONE ENCOUNTER
Called pt back to clarify medication instructions. Medication instructions is as follows and was relayed to pt:    Decrease venlafaxine xr to 75mg po qday for two weeks, 37.5mg po qday for two weeks, then stop. START trintellix 5mgpo qday for two weeks, then 10mg po qday to target depression and anxiety.    Pt understood and confirmed. Pt reported he only has 2 tablets left of Effexor XR 37.5mg and has to still take 1 tablet daily for two weeks. Pt will need 12 tablets of this medication to complete tapering off of this medication. Medication order pended for 12 tablets to preferred pharmacy. Pt requested our office notify him when this prescription has been sent. Please review.

## 2022-10-21 NOTE — PROGRESS NOTES
"Progress Note    Date: October 31, 2022  Time In: 1505  Time Out: 1600    Patient Name: Thiago Kellogg  Patient Age: 47 y.o.      Subjective   History of Present Illness     From previous progress note on 8/5/22:  Patient was encouraged to create a routine and structure in his life in order to promote healthy sleeping patterns and possibly increase motivation.  He was also encouraged to continue exercising in order to decrease symptoms of anxiety (he acknowledged that exercising is beneficial to him).    Thiago Kellogg is a 47 y.o. male who presents today as a follow-up for continued psychotherapy.  Patient reports that he went on a road trip to [place] earlier this month, and found it to be rejuvenating.  He states that he enjoys trips and is looking forward to going on another one soon.  He states that he has been compliant with his medication, and feels that it is effective.  He states that socializing with others has been \"a little easier\" and he rates his anxiety at a 4 currently (on a scale of 1-10; 1=least anxious, 10=most anxious).      Assessment    Mental Status Exam     Appearance: good hygiene and dressed appropriately for the weather  Behavior: calm  Cooperation:  engaged, cooperative, attentive, and friendly  Eye Contact:  good  Affect:  congruent  Mood: anxious  Speech: responsive  Thought Process:  linear and organized  Thought Content: appropriate and abstract  Suicidal: denies  Homicidal:  denies  Hallucinations:  denies  Memory:  intact  Orientation:  person, place, time, and situation  Reliability:  reliable  Insight:  good  Judgement:  good     Clinical Intervention       ICD-10-CM ICD-9-CM   1. Generalized anxiety disorder  F41.1 300.02   2. Major depressive disorder, recurrent episode, moderate (HCC)  F33.1 296.32        Individual psychotherapy was provided utilizing CBT techniques to manage stress, acknowledge sources of feelings and behaviors, challenge negative thinking patterns and " establish therapeutic alliance.  Patient had good insight into his symptoms and patterns of behavior.  He reflected on past cycles of anxiety and depression and explored the causes of increases in anxiety.  Therapist offered psychoeducation regarding anxiety and challenging avoidance in order to reduce symptoms.  Patient is motivated to continue focusing on his therapeutic objectives in order to relieve symptoms.    Plan   Plan & Goals     Patient is to continue going to the gym regularly, as discussed in session.  He will explore the application process to find a job, and we will discuss this further next session, particularly how it relates to his symptoms of anxiety.    1. Patient acknowledged and verbally consented to continue working toward resolving current treatment plan goals and was educated on the importance of participation in the therapeutic process.  2. Patient will remain compliant with medication regimen as prescribed. Discuss any medication side effects, questions or concerns with prescribing provider.  3. Call 911 or present to the nearest emergency room in an emergency situation. National Suicide Prevention Lifeline: 5-615-344-5985.    Return in about 2 weeks (around 11/14/2022) for Next scheduled follow up.    ____________________  This document has been electronically signed by Dai Link LCSW  October 31, 2022 17:03 EDT    Part of this note may be an electronic transcription/translation of spoken language to printed text using the Dragon Dictation System.

## 2022-10-27 ENCOUNTER — OFFICE VISIT (OUTPATIENT)
Dept: PSYCHIATRY | Facility: CLINIC | Age: 47
End: 2022-10-27

## 2022-10-27 VITALS
DIASTOLIC BLOOD PRESSURE: 73 MMHG | WEIGHT: 291 LBS | HEART RATE: 86 BPM | SYSTOLIC BLOOD PRESSURE: 130 MMHG | BODY MASS INDEX: 41.66 KG/M2 | HEIGHT: 70 IN

## 2022-10-27 DIAGNOSIS — F41.1 GENERALIZED ANXIETY DISORDER: ICD-10-CM

## 2022-10-27 DIAGNOSIS — F33.1 MAJOR DEPRESSIVE DISORDER, RECURRENT EPISODE, MODERATE: Primary | ICD-10-CM

## 2022-10-27 DIAGNOSIS — F51.05 INSOMNIA DUE TO MENTAL DISORDER: ICD-10-CM

## 2022-10-27 PROCEDURE — 90833 PSYTX W PT W E/M 30 MIN: CPT | Performed by: NURSE PRACTITIONER

## 2022-10-27 PROCEDURE — 99214 OFFICE O/P EST MOD 30 MIN: CPT | Performed by: NURSE PRACTITIONER
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
## 2022-10-27 NOTE — TREATMENT PLAN
Multi-Disciplinary Problems (from Behavioral Health Treatment Plan)    Active Problems     Problem: Depression  Start Date: 10/27/22    Problem Details: The patient self-scales this problem as a 2 with 10 being the worst.        Goal Priority Start Date Expected End Date End Date    Patient will demonstrate the ability to initiate new constructive life skills outside of sessions on a consistent basis. -- 10/27/22 -- --    Goal Details: Progress toward goal:  Not appropriate to rate progress toward goal since this is the initial treatment plan.        Goal Intervention Frequency Start Date End Date    Assist patient in setting attainable activities of daily living goals. PRN 10/27/22 --    Goal Intervention Frequency Start Date End Date    Provide education about depression Weekly 10/27/22 --    Intervention Details: Duration of treatment until until remission of symptoms.        Goal Intervention Frequency Start Date End Date    Assist patient in developing healthy coping strategies. Weekly 10/27/22 --    Intervention Details: Duration of treatment until until remission of symptoms.                    Reviewed By     Gustavo Jeffers APRN 10/27/22 2033                 I have discussed and reviewed this treatment plan with the patient.

## 2022-10-31 ENCOUNTER — OFFICE VISIT (OUTPATIENT)
Dept: PSYCHIATRY | Facility: CLINIC | Age: 47
End: 2022-10-31

## 2022-10-31 DIAGNOSIS — F41.1 GENERALIZED ANXIETY DISORDER: Primary | ICD-10-CM

## 2022-10-31 DIAGNOSIS — F33.1 MAJOR DEPRESSIVE DISORDER, RECURRENT EPISODE, MODERATE: ICD-10-CM

## 2022-10-31 PROCEDURE — 90837 PSYTX W PT 60 MINUTES: CPT | Performed by: SOCIAL WORKER

## 2022-10-31 NOTE — PROGRESS NOTES
"Progress Note    Date: October 31, 2022  Time In: 1505  Time Out: 1600    Patient Name: Thiago Kellogg  Patient Age: 47 y.o.      Subjective   History of Present Illness     From previous progress note on 8/5/22:  Patient was encouraged to create a routine and structure in his life in order to promote healthy sleeping patterns and possibly increase motivation.  He was also encouraged to continue exercising in order to decrease symptoms of anxiety (he acknowledged that exercising is beneficial to him).    Thiago Kellogg is a 47 y.o. male who presents today as a follow-up for continued psychotherapy.  Patient reports that he went on a road trip to [place] earlier this month, and found it to be rejuvenating.  He states that he enjoys trips and is looking forward to going on another one soon.  He states that he has been compliant with his medication, and feels that it is effective.  He states that socializing with others has been \"a little easier\" and he rates his anxiety at a 4 currently (on a scale of 1-10; 1=least anxious, 10=most anxious).      Assessment    Mental Status Exam     Appearance: good hygiene and dressed appropriately for the weather  Behavior: calm  Cooperation:  engaged, cooperative, attentive, and friendly  Eye Contact:  good  Affect:  congruent  Mood: anxious  Speech: responsive  Thought Process:  linear and organized  Thought Content: appropriate and abstract  Suicidal: denies  Homicidal:  denies  Hallucinations:  denies  Memory:  intact  Orientation:  person, place, time, and situation  Reliability:  reliable  Insight:  good  Judgement:  good     Clinical Intervention       ICD-10-CM ICD-9-CM   1. Generalized anxiety disorder  F41.1 300.02   2. Major depressive disorder, recurrent episode, moderate (HCC)  F33.1 296.32        Individual psychotherapy was provided utilizing CBT techniques to manage stress, acknowledge sources of feelings and behaviors, challenge negative thinking patterns and " establish therapeutic alliance.  Patient had good insight into his symptoms and patterns of behavior.  He reflected on past cycles of anxiety and depression and explored the causes of increases in anxiety.  Therapist offered psychoeducation regarding anxiety and challenging avoidance in order to reduce symptoms.  Patient is motivated to continue focusing on his therapeutic objectives in order to relieve symptoms.    Plan   Plan & Goals     Patient is to continue going to the gym regularly, as discussed in session.  He will explore the application process to find a job, and we will discuss this further next session, particularly how it relates to his symptoms of anxiety.    1. Patient acknowledged and verbally consented to continue working toward resolving current treatment plan goals and was educated on the importance of participation in the therapeutic process.  2. Patient will remain compliant with medication regimen as prescribed. Discuss any medication side effects, questions or concerns with prescribing provider.  3. Call 911 or present to the nearest emergency room in an emergency situation. National Suicide Prevention Lifeline: 2-287-293-1764.    Return in about 2 weeks (around 11/14/2022) for Next scheduled follow up.    ____________________  This document has been electronically signed by Dai Link LCSW  October 31, 2022 17:06 EDT    Part of this note may be an electronic transcription/translation of spoken language to printed text using the Dragon Dictation System.

## 2022-10-31 NOTE — PSYCHOTHERAPY NOTE
Psychotherapy Note - October 31, 2022    Working out every day   Mental shift - new medication  Taken off effexor - started on trintellix (was feeling lethargic) - did take away some of the medical anxiety (hypochondria) - wasn't helping with social anxiety    [place] - beginning of the month  Friend had heart surgery or valve replacement  I drove out there and felt recharged    Sat with this friend for 2 hours  I usually would have made an excuse to get out of it  Head started spinning a little bit - tried to power through it  Eventually she was ok with leaving    4/10    Ongoing for 12 years    My goal is to be able to work again and to live sustainably  I have a fear of being old and dependent    Lives alone   In debt and it was a good time to sell the house    He's got his own place - [place] - he's established    I go to sleep at 6am   We'll hang out and eat dinner together after I wake up at 4pm  Used to working night shift  Usually 3-4pm until about midnight  I wish I was on a normal schedule    What keeps me up at night - I'll be mentally active, spinning    I like to travel a lot  I thrive on road trips  I would like for us to be able to do that    Wants to get a part-time job    Strategy to get a job right now ([employer], [employer])  Part-time, not a huge commitment - not wanting to burn bridges

## 2022-11-07 ENCOUNTER — LAB (OUTPATIENT)
Dept: INTERNAL MEDICINE | Facility: CLINIC | Age: 47
End: 2022-11-07

## 2022-11-07 DIAGNOSIS — Z23 NEED FOR COVID-19 VACCINE: Primary | ICD-10-CM

## 2022-11-07 PROCEDURE — 91312 COVID-19 (PFIZER) BIVALENT BOOSTER 12+YRS: CPT | Performed by: NURSE PRACTITIONER

## 2022-11-07 PROCEDURE — 0124A PR ADM SARSCOV2 30MCG/0.3ML BST: CPT | Performed by: NURSE PRACTITIONER

## 2022-11-11 NOTE — PROGRESS NOTES
"Progress Note    Date: November 14, 2022  Time In: 1505  Time Out: 3018    Patient Name: Thiago Kellogg  Patient Age: 47 y.o.      Subjective   History of Present Illness     From previous progress note on 10/31/22:  Patient is to continue going to the gym regularly, as discussed in session.  He will explore the application process to find a job, and we will discuss this further next session, particularly how it relates to his symptoms of anxiety.    Thiago Kellogg is a 47 y.o. male who presents today as a follow-up for continued psychotherapy.  Patient reports that he has had intrusive thoughts which are disturbing to him, including illness-related thoughts, perseveration on the fear of death, and social anxiety.  Patient states that he spends \"a lot of time\" self reflecting on his thoughts and behaviors.  Patient states that he continues to work out, and has applied for a job, which he expects to start within the next week.  He hopes his new job to help him break the cycle of anxiety to some extent.    Assessment    Mental Status Exam     Appearance: good hygiene and dressed appropriately for the weather  Behavior: calm  Cooperation:  engaged, cooperative, attentive, and friendly  Eye Contact:  good  Affect:  congruent  Mood: depressed and anxious  Speech: responsive  Thought Process:  linear and organized  Thought Content: intrusive thoughts  Suicidal: denies  Homicidal:  denies  Hallucinations:  denies  Memory:  intact  Orientation:  person, place, time, and situation  Reliability:  reliable  Insight:  good  Judgement:  good     Clinical Intervention       ICD-10-CM ICD-9-CM   1. Generalized anxiety disorder  F41.1 300.02   2. Major depressive disorder, recurrent episode, moderate ([facility])  F33.1 296.32        Individual psychotherapy was provided utilizing CBT techniques to encourage expression of thoughts and feelings, acknowledge sources of feelings and behaviors, encourage creativity to enhance expression " and challenge negative thinking patterns.  Patient was encouraged to explore sources to his intrusive thoughts.  Therapist provided psychoeducation on obsessive thought patterns and compulsive behaviors.  Patient was encouraged to keep a record of behaviors and responses to his intrusive thoughts, as this will help rule out OCD, as patient exhibits these traits.  Patient had good insight into his symptoms and patterns of behavior.  He rates his anxiety at a 7 or 8 while discussing sources to his emotions.  After deep breathing exercise, patient states that his anxiety was reduced to a 5 (on a scale of 1-10; 1=least anxious, 10=most anxious).     Plan   Plan & Goals     Patient is to engage in sensory activation (possible creative expression of emotions) in order to break internal rumination and obsessive thoughts.  Patient could benefit from setting realistic goals for himself and setting a clear routine, especially involving his upcoming employment.  We will discuss further next session.    1. Patient acknowledged and verbally consented to continue working toward resolving current treatment plan goals and was educated on the importance of participation in the therapeutic process.  2. Patient will remain compliant with medication regimen as prescribed. Discuss any medication side effects, questions or concerns with prescribing provider.  3. Call 911 or present to the nearest emergency room in an emergency situation. National Suicide Prevention Lifeline: 1-986.561.9318.    Return in about 2 weeks (around 11/28/2022) for Next scheduled follow up.    ____________________  This document has been electronically signed by Dai Link LCSW  November 14, 2022 16:08 EST    Part of this note may be an electronic transcription/translation of spoken language to printed text using the Dragon Dictation System.

## 2022-11-14 ENCOUNTER — OFFICE VISIT (OUTPATIENT)
Dept: PSYCHIATRY | Facility: CLINIC | Age: 47
End: 2022-11-14

## 2022-11-14 DIAGNOSIS — F33.1 MAJOR DEPRESSIVE DISORDER, RECURRENT EPISODE, MODERATE: ICD-10-CM

## 2022-11-14 DIAGNOSIS — F41.1 GENERALIZED ANXIETY DISORDER: Primary | ICD-10-CM

## 2022-11-14 PROCEDURE — 90837 PSYTX W PT 60 MINUTES: CPT | Performed by: SOCIAL WORKER

## 2022-11-14 NOTE — PSYCHOTHERAPY NOTE
Psychotherapy Note - 2022    Partner - [place] - going to have Thanksgiving dinner with him, plans are tentative    Sleeping too much  Trying to stay off caffeine  Went to bed early for me  Took Trazodone - normal night, I take one    Forced myself to come    Anxiety is still present  In the past 2 weeks, going through depression as well (feeling like what am I doing here)  Feelings of uslessness    Existential crisis - basically since I've been an adult  Once of my areas of anxiety  Fear of death  I feel myself getting older and closer to death    Towards the beginning of this year, experienced health anxiety  Hypochondria  Cigarettes and marijuana for a long time  I was told to get a chest x-ray  Earlier this year, lung infections, cough, started to develop health anxiety and feeling like I'm going to get sick    Asked for a scan  Insurance denied CT scan and I offered to pay cash  I got the feeling that she thought I was over-reacting  The insurance company said it wasn't medically-necessary    It got approve the 2nd time    Around that time, weeks went by  I started taking Effexor and it wiped out health anxiety  Since I stopped taking     I never felt health anxiety until COVID hit  I was in a dense part of [place] at that time and people weren't taking care of themselves  I developed a lot of anxiety surrounding health  Living with my ex at that time  Was living pretty comfortably, but then everything had to be sprayed with   If touching something contaminated, have to wash it  [place] had to be sprayed down and cautious  I was afraid that if I got it, I was going to die  I was overweight  Within the first couple of months, one of my mom's uncles  from COVID (he was 73)  To my knowledge, I've never had it  I've gotten the vaccines   I started to feel better after I got the 2nd vaccine    If there's something wrong, I want to know    Intrusive thoughts    Self-reflecting has taken  over  Not ever - no creative expression    Grew up in a household with a lot of music  Never participated in it  Appreciated other mediums of art  Painting  Fashion shows - my friend, Nikolas Garcia - weekends - 2  Days/week 10-hr shifts    Today, we've talked about one facet of anxiety    Social anxiety
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
## 2022-11-28 ENCOUNTER — OFFICE VISIT (OUTPATIENT)
Dept: PSYCHIATRY | Facility: CLINIC | Age: 47
End: 2022-11-28

## 2022-11-28 VITALS
BODY MASS INDEX: 42.06 KG/M2 | SYSTOLIC BLOOD PRESSURE: 125 MMHG | DIASTOLIC BLOOD PRESSURE: 64 MMHG | WEIGHT: 293.8 LBS | HEIGHT: 70 IN

## 2022-11-28 DIAGNOSIS — F51.05 INSOMNIA DUE TO MENTAL DISORDER: ICD-10-CM

## 2022-11-28 DIAGNOSIS — F33.1 MAJOR DEPRESSIVE DISORDER, RECURRENT EPISODE, MODERATE: Primary | ICD-10-CM

## 2022-11-28 DIAGNOSIS — F41.1 GENERALIZED ANXIETY DISORDER: ICD-10-CM

## 2022-11-28 PROCEDURE — 90833 PSYTX W PT W E/M 30 MIN: CPT | Performed by: NURSE PRACTITIONER

## 2022-11-28 PROCEDURE — 99214 OFFICE O/P EST MOD 30 MIN: CPT | Performed by: NURSE PRACTITIONER

## 2022-11-28 RX ORDER — AMOXICILLIN 500 MG/1
CAPSULE ORAL
COMMUNITY
Start: 2022-11-08 | End: 2022-12-29

## 2022-11-28 RX ORDER — CHLORHEXIDINE GLUCONATE 0.12 MG/ML
RINSE ORAL
COMMUNITY
Start: 2022-11-08

## 2022-11-28 RX ORDER — ACETAMINOPHEN AND CODEINE PHOSPHATE 300; 30 MG/1; MG/1
1 TABLET ORAL EVERY 4 HOURS PRN
COMMUNITY
Start: 2022-11-08 | End: 2022-12-29
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
## 2022-11-29 ENCOUNTER — OFFICE VISIT (OUTPATIENT)
Dept: PSYCHIATRY | Facility: CLINIC | Age: 47
End: 2022-11-29

## 2022-11-29 DIAGNOSIS — F41.1 GENERALIZED ANXIETY DISORDER: ICD-10-CM

## 2022-11-29 DIAGNOSIS — F33.1 MAJOR DEPRESSIVE DISORDER, RECURRENT EPISODE, MODERATE: Primary | ICD-10-CM

## 2022-11-29 PROCEDURE — 90837 PSYTX W PT 60 MINUTES: CPT | Performed by: SOCIAL WORKER
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
## 2022-12-05 NOTE — PROGRESS NOTES
"Progress Note    Date: December 13, 2022  Time In: 1502  Time Out: 1600    Patient Name: Thiago Kellogg  Patient Age: 47 y.o.      Subjective   History of Present Illness     From previous progress note on 11/29/22:  Patient is to continue reflecting on his relationship with his father, trauma responses, as well as vulnerability to expressing his own emotions and needs (as discussed in session).  Patient could benefit from continuing to explore past trauma and related symptoms.  We will discuss further next session.    Thiago Kellogg is a 47 y.o. male who presents today as a follow-up for continued psychotherapy. Patient reports that he started his new job and felt \"mild-moderate anxiety\" at certain parts of his day. He states that he used cognitive re-framing to manage his anxiety and has decided to set a goal to stay in this position. He acknowledged continuing having anxiety around meeting new people or being in groups of people and doing something to \"humiliate\" himself.       Assessment    Mental Status Exam     Appearance: good hygiene and dressed appropriately for the weather  Behavior: calm  Cooperation:  engaged, cooperative, attentive, and friendly  Eye Contact:  good  Affect:  congruent  Mood: sad and anxious  Speech: responsive  Thought Process:  linear and organized  Thought Content: intrusive thoughts and rumination  Suicidal: denies  Homicidal:  denies  Hallucinations:  denies  Memory:  intact  Orientation:  person, place, time, and situation  Reliability:  reliable  Insight:  good  Judgement:  good     Clinical Intervention       ICD-10-CM ICD-9-CM   1. Generalized anxiety disorder  F41.1 300.02   2. Major depressive disorder, recurrent episode, moderate (HCC)  F33.1 296.32        Individual psychotherapy was provided utilizing CBT techniques to recognize patterns of behavior, acknowledge sources of feelings and behaviors, challenge negative thinking patterns, recognize cognitive distortions and " "provide support. Therapist encouraged patient to explore sources to his patterns of behavior and patient had good insight into his symptoms. He states that he struggles with high anxiety in social situations due to \"not being able to relate\" on a personal level with most people. Patient describes feeling \"empty\" at times.  He states that he has learned to be \"in control\" of his emotions, to prevent embarrassment, and that this has created a natural process of withdrawing from others.    Plan   Plan & Goals     Patient could benefit from continuing to explore causes of anxiety, specifically in social situations, due to his own self-criticism. Patient was challenged to reflect on his need to be \"in control\" of his emotions. We will continue to explore self-identity through a strengths-based approach in future sessions.    1. Patient acknowledged and verbally consented to continue working toward resolving current treatment plan goals and was educated on the importance of participation in the therapeutic process.  2. Patient will remain compliant with medication regimen as prescribed. Discuss any medication side effects, questions or concerns with prescribing provider.  3. Call 911 or present to the nearest emergency room in an emergency situation. National Suicide Prevention Lifeline: 8-599-720-8802.    Return in about 2 weeks (around 12/27/2022) for Next scheduled follow up.    ____________________  This document has been electronically signed by Dai Link LCSW  December 13, 2022 16:15 EST    Part of this note may be an electronic transcription/translation of spoken language to printed text using the Dragon Dictation System.  "

## 2022-12-13 ENCOUNTER — OFFICE VISIT (OUTPATIENT)
Dept: PSYCHIATRY | Facility: CLINIC | Age: 47
End: 2022-12-13

## 2022-12-13 DIAGNOSIS — F41.1 GENERALIZED ANXIETY DISORDER: Primary | ICD-10-CM

## 2022-12-13 DIAGNOSIS — F33.1 MAJOR DEPRESSIVE DISORDER, RECURRENT EPISODE, MODERATE: ICD-10-CM

## 2022-12-13 PROCEDURE — 90837 PSYTX W PT 60 MINUTES: CPT | Performed by: SOCIAL WORKER

## 2022-12-13 NOTE — PSYCHOTHERAPY NOTE
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    Lower level of base anxiety to begin with (medication is helping)  Last session I said I expected things of them  Same situation as last time  I didn't get annoyed and said to myself \"I'm going to let this play out\"    I used to be that way  I would have obsessed - didn't take it personally    Maturity helped - rounded the corner - getting to the stage of not being youthful anymore  2nd half of life as a human being    Existential crisis  Better at picturing myself - wanted to be a person that was not reactive (externally)    Struggling to keep it chill    My mind would keep going back to it    The medication I'm on has made me more in a balanced mood most of the time  Baseline mood is lowered    Especially lately, I'm able to see other people struggling with over-reacting  Cultural or societal thing - increasing over the past decade  Everybody is really on edge and upset about everything - combative    Social situation - not reacting the right way  Going to embarrass myself or humiliate myself  The last 15 years - humiliation  Working at a [employer] in [place] - drinking a lot of alcohol (started regretted it)  **no long-term relationship (would date for a few months at a time)  **often I would lose interest in them, co-dependency    I try not to drink at all - last drank 3-4 weeks (drank too much) - I regretted drinking too much - I get drunk enough to where I get not myself  Saw Jean-Paul (boyfriend) some of the time    Fear of losing " "control    10 years ago - meeting new people - withdrawing from others  If they are patient and understanding - not socially aggressive  I think I seem mysterious to people - there's a code that they like to crack  I don't put it on - I'm not a simple person    Let's take it slow - I want to have control in how fast we go  Often, I like that people are interested in me - but they have to be worthy    Jean-Paul and I get along really well  Intelligent, talk about a lot of different things  Instantly, not judgmental, took me as I am    People who are a \"template\" - basic, not much life experience    SELF-CRITICISM  One of the things that triggers me or gets me really going anxiety-wise  Obviously when people want to talk about families and personal lives  That bothers me because I've never lived a traditional life    I was a night owl - only child - I was usually pretty lonely  Cousin and her mom that lived with us (same age, like my sister)  Not a lot of childhood interaction  Knew I was urbina from teenage years - never wanted a family or wanted kids    Most people take me for straight  I can see where they are put off by why I'm not  or having kids    Came out at 22 years old - have been stuffing myself back into the closet    One of the conclusions that I've concluded is that I'm tribalist  I don't have a community - not even in the urbina community  [place] for urbina people    [place] [facility] [place] [facility]  I lived there with my ex-boyfriend 4-5 years ago (the first time I felt relief and calmness)  [place] - comparatively    Major roots of anxiety - no [place]  There aren't places like that everywhere    I would have established normalcy  Personality   Often feel empty    Wanting to relate to other people    A major thing that people connect on - that I'm not apart of  A major part of society - that I will never have (family/kids)    I would like " more quantity (not necessarily quality over quantity)  I need me time  **one of the things that compounds this - as I've gotten older and wiser and the landscape out there, I've realized more and more how much contempt a lot of people have out there for urbina people - when I was younger, I used to think everyone's cool with it, as I've gotten older and seen people in their candid states & social media has revealed a lot - people really do loathe homosexuality   **deep hatred

## 2022-12-29 ENCOUNTER — OFFICE VISIT (OUTPATIENT)
Dept: PSYCHIATRY | Facility: CLINIC | Age: 47
End: 2022-12-29

## 2022-12-29 VITALS
HEART RATE: 83 BPM | SYSTOLIC BLOOD PRESSURE: 136 MMHG | HEIGHT: 70 IN | WEIGHT: 295 LBS | BODY MASS INDEX: 42.23 KG/M2 | DIASTOLIC BLOOD PRESSURE: 65 MMHG

## 2022-12-29 DIAGNOSIS — F33.1 MAJOR DEPRESSIVE DISORDER, RECURRENT EPISODE, MODERATE: Primary | ICD-10-CM

## 2022-12-29 DIAGNOSIS — F41.1 GENERALIZED ANXIETY DISORDER: ICD-10-CM

## 2022-12-29 DIAGNOSIS — F51.05 INSOMNIA DUE TO MENTAL DISORDER: ICD-10-CM

## 2022-12-29 PROCEDURE — 90833 PSYTX W PT W E/M 30 MIN: CPT | Performed by: NURSE PRACTITIONER

## 2022-12-29 PROCEDURE — 99214 OFFICE O/P EST MOD 30 MIN: CPT | Performed by: NURSE PRACTITIONER
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
## 2023-01-16 ENCOUNTER — HOSPITAL ENCOUNTER (OUTPATIENT)
Dept: CT IMAGING | Facility: HOSPITAL | Age: 48
Discharge: HOME | End: 2023-01-16
Admitting: NURSE PRACTITIONER
Payer: MEDICAID

## 2023-01-16 PROCEDURE — 0 IOPAMIDOL PER 1 ML: Performed by: NURSE PRACTITIONER

## 2023-01-16 PROCEDURE — 71260 CT THORAX DX C+: CPT

## 2023-01-16 RX ADMIN — IOPAMIDOL 100 ML: 755 INJECTION, SOLUTION INTRAVENOUS at 14:18

## 2023-01-19 ENCOUNTER — CLINICAL SUPPORT (OUTPATIENT)
Dept: INTERNAL MEDICINE | Facility: CLINIC | Age: 48
End: 2023-01-19
Payer: MEDICAID

## 2023-01-19 ENCOUNTER — TELEPHONE (OUTPATIENT)
Dept: INTERNAL MEDICINE | Facility: CLINIC | Age: 48
End: 2023-01-19
Payer: MEDICAID

## 2023-01-19 DIAGNOSIS — Z12.11 SCREENING FOR COLON CANCER: Primary | ICD-10-CM

## 2023-01-19 DIAGNOSIS — Z23 NEED FOR PNEUMOCOCCAL VACCINE: Primary | ICD-10-CM

## 2023-01-19 PROCEDURE — 90471 IMMUNIZATION ADMIN: CPT | Performed by: INTERNAL MEDICINE

## 2023-01-19 PROCEDURE — 90677 PCV20 VACCINE IM: CPT | Performed by: INTERNAL MEDICINE

## 2023-01-27 ENCOUNTER — OFFICE VISIT (OUTPATIENT)
Dept: PSYCHIATRY | Facility: CLINIC | Age: 48
End: 2023-01-27
Payer: MEDICAID

## 2023-01-27 VITALS
DIASTOLIC BLOOD PRESSURE: 70 MMHG | SYSTOLIC BLOOD PRESSURE: 128 MMHG | WEIGHT: 296 LBS | HEIGHT: 70 IN | HEART RATE: 82 BPM | BODY MASS INDEX: 42.37 KG/M2

## 2023-01-27 DIAGNOSIS — F33.1 MAJOR DEPRESSIVE DISORDER, RECURRENT EPISODE, MODERATE: Primary | ICD-10-CM

## 2023-01-27 DIAGNOSIS — F41.1 GENERALIZED ANXIETY DISORDER: ICD-10-CM

## 2023-01-27 DIAGNOSIS — F51.05 INSOMNIA DUE TO MENTAL DISORDER: ICD-10-CM

## 2023-01-27 PROCEDURE — 90833 PSYTX W PT W E/M 30 MIN: CPT | Performed by: NURSE PRACTITIONER

## 2023-01-27 PROCEDURE — 99214 OFFICE O/P EST MOD 30 MIN: CPT | Performed by: NURSE PRACTITIONER
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
## 2023-02-09 DIAGNOSIS — F33.1 MAJOR DEPRESSIVE DISORDER, RECURRENT EPISODE, MODERATE: ICD-10-CM

## 2023-02-09 RX ORDER — VORTIOXETINE 10 MG/1
TABLET, FILM COATED ORAL
Qty: 30 TABLET | Refills: 1 | OUTPATIENT
Start: 2023-02-09

## 2023-02-10 ENCOUNTER — LAB (OUTPATIENT)
Dept: LAB | Facility: HOSPITAL | Age: 48
End: 2023-02-10
Payer: MEDICAID

## 2023-02-10 ENCOUNTER — OFFICE VISIT (OUTPATIENT)
Dept: PSYCHIATRY | Facility: CLINIC | Age: 48
End: 2023-02-10
Payer: MEDICAID

## 2023-02-10 VITALS
HEART RATE: 71 BPM | BODY MASS INDEX: 42.52 KG/M2 | DIASTOLIC BLOOD PRESSURE: 73 MMHG | SYSTOLIC BLOOD PRESSURE: 137 MMHG | WEIGHT: 297 LBS | HEIGHT: 70 IN

## 2023-02-10 DIAGNOSIS — F33.1 MAJOR DEPRESSIVE DISORDER, RECURRENT EPISODE, MODERATE: Primary | ICD-10-CM

## 2023-02-10 DIAGNOSIS — E55.9 VITAMIN D INSUFFICIENCY: ICD-10-CM

## 2023-02-10 DIAGNOSIS — Z00.00 ANNUAL PHYSICAL EXAM: ICD-10-CM

## 2023-02-10 DIAGNOSIS — F51.05 INSOMNIA DUE TO MENTAL DISORDER: ICD-10-CM

## 2023-02-10 DIAGNOSIS — F41.1 GENERALIZED ANXIETY DISORDER: ICD-10-CM

## 2023-02-10 LAB
25(OH)D3 SERPL-MCNC: 36.6 NG/ML (ref 30–100)
ALBUMIN SERPL-MCNC: 4.2 G/DL (ref 3.5–5.2)
ALBUMIN/GLOB SERPL: 1.4 G/DL
ALP SERPL-CCNC: 100 U/L (ref 39–117)
ALT SERPL W P-5'-P-CCNC: 25 U/L (ref 1–41)
ANION GAP SERPL CALCULATED.3IONS-SCNC: 9 MMOL/L (ref 5–15)
AST SERPL-CCNC: 31 U/L (ref 1–40)
BASOPHILS # BLD AUTO: 0.06 10*3/MM3 (ref 0–0.2)
BASOPHILS NFR BLD AUTO: 1.2 % (ref 0–1.5)
BILIRUB SERPL-MCNC: 0.3 MG/DL (ref 0–1.2)
BUN SERPL-MCNC: 23 MG/DL (ref 6–20)
BUN/CREAT SERPL: 22.3 (ref 7–25)
CALCIUM SPEC-SCNC: 9.7 MG/DL (ref 8.6–10.5)
CHLORIDE SERPL-SCNC: 103 MMOL/L (ref 98–107)
CHOLEST SERPL-MCNC: 178 MG/DL (ref 0–200)
CO2 SERPL-SCNC: 25 MMOL/L (ref 22–29)
CREAT SERPL-MCNC: 1.03 MG/DL (ref 0.76–1.27)
DEPRECATED RDW RBC AUTO: 40.2 FL (ref 37–54)
EGFRCR SERPLBLD CKD-EPI 2021: 91.3 ML/MIN/1.73
EOSINOPHIL # BLD AUTO: 0.19 10*3/MM3 (ref 0–0.4)
EOSINOPHIL NFR BLD AUTO: 3.9 % (ref 0.3–6.2)
ERYTHROCYTE [DISTWIDTH] IN BLOOD BY AUTOMATED COUNT: 13.1 % (ref 12.3–15.4)
GLOBULIN UR ELPH-MCNC: 3.1 GM/DL
GLUCOSE SERPL-MCNC: 100 MG/DL (ref 65–99)
HCT VFR BLD AUTO: 43.5 % (ref 37.5–51)
HDLC SERPL-MCNC: 40 MG/DL (ref 40–60)
HGB BLD-MCNC: 14.4 G/DL (ref 13–17.7)
IMM GRANULOCYTES # BLD AUTO: 0.01 10*3/MM3 (ref 0–0.05)
IMM GRANULOCYTES NFR BLD AUTO: 0.2 % (ref 0–0.5)
LDLC SERPL CALC-MCNC: 117 MG/DL (ref 0–100)
LDLC/HDLC SERPL: 2.88 {RATIO}
LYMPHOCYTES # BLD AUTO: 1.65 10*3/MM3 (ref 0.7–3.1)
LYMPHOCYTES NFR BLD AUTO: 34.1 % (ref 19.6–45.3)
MCH RBC QN AUTO: 28.4 PG (ref 26.6–33)
MCHC RBC AUTO-ENTMCNC: 33.1 G/DL (ref 31.5–35.7)
MCV RBC AUTO: 85.8 FL (ref 79–97)
MONOCYTES # BLD AUTO: 0.46 10*3/MM3 (ref 0.1–0.9)
MONOCYTES NFR BLD AUTO: 9.5 % (ref 5–12)
NEUTROPHILS NFR BLD AUTO: 2.47 10*3/MM3 (ref 1.7–7)
NEUTROPHILS NFR BLD AUTO: 51.1 % (ref 42.7–76)
NRBC BLD AUTO-RTO: 0 /100 WBC (ref 0–0.2)
PLATELET # BLD AUTO: 330 10*3/MM3 (ref 140–450)
PMV BLD AUTO: 10.4 FL (ref 6–12)
POTASSIUM SERPL-SCNC: 4.5 MMOL/L (ref 3.5–5.2)
PROT SERPL-MCNC: 7.3 G/DL (ref 6–8.5)
RBC # BLD AUTO: 5.07 10*6/MM3 (ref 4.14–5.8)
SODIUM SERPL-SCNC: 137 MMOL/L (ref 136–145)
T4 FREE SERPL-MCNC: 1.21 NG/DL (ref 0.93–1.7)
TRIGL SERPL-MCNC: 115 MG/DL (ref 0–150)
TSH SERPL DL<=0.05 MIU/L-ACNC: 3.14 UIU/ML (ref 0.27–4.2)
VLDLC SERPL-MCNC: 21 MG/DL (ref 5–40)
WBC NRBC COR # BLD: 4.84 10*3/MM3 (ref 3.4–10.8)

## 2023-02-10 PROCEDURE — 90833 PSYTX W PT W E/M 30 MIN: CPT | Performed by: NURSE PRACTITIONER

## 2023-02-10 PROCEDURE — 36415 COLL VENOUS BLD VENIPUNCTURE: CPT

## 2023-02-10 PROCEDURE — 82306 VITAMIN D 25 HYDROXY: CPT

## 2023-02-10 PROCEDURE — 84439 ASSAY OF FREE THYROXINE: CPT

## 2023-02-10 PROCEDURE — 80050 GENERAL HEALTH PANEL: CPT

## 2023-02-10 PROCEDURE — 99214 OFFICE O/P EST MOD 30 MIN: CPT | Performed by: NURSE PRACTITIONER

## 2023-02-10 PROCEDURE — 80061 LIPID PANEL: CPT

## 2023-02-10 RX ORDER — TRAZODONE HYDROCHLORIDE 50 MG/1
50 TABLET ORAL NIGHTLY
Qty: 30 TABLET | Refills: 2 | Status: SHIPPED | OUTPATIENT
Start: 2023-02-10 | End: 2023-05-11
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
## 2023-02-12 DIAGNOSIS — R73.01 IMPAIRED FASTING GLUCOSE: Primary | ICD-10-CM

## 2023-02-15 NOTE — PROGRESS NOTES
"Progress Note    Date: 02/22/2023  Time In: 1503  Time Out: 1601    Patient Name: Thiago Kellogg  Patient Age: 47 y.o.    CHIEF COMPLAINT: Anxiety, Depression    Subjective   History of Present Illness     From previous progress note on 12/13/22:  Patient could benefit from continuing to explore causes of anxiety, specifically in social situations, due to his own self-criticism. Patient was challenged to reflect on his need to be \"in control\" of his emotions. We will continue to explore self-identity through a strengths-based approach in future sessions.    Thiago Kellogg is a 47 y.o. male who presents today as a follow-up for continued psychotherapy.  Patient reports that he stopped working since our last session, but has consistently been going to the gym, and feels that his recent medication change has improved his symptoms as well.  Patient reports that he is compliant with his medication, and has been making efforts to have an optimistic outlook on the world.  He states that he has been reflecting on his cognitive processes and fear structures, as we discussed in previous sessions.  He states that he feels that his intrusive thoughts have decreased, but he continues to avoid certain situations due to increased anxiety.      Assessment    Mental Status Exam     Appearance: good hygiene and dressed appropriately for the weather  Behavior: calm  Cooperation:  engaged, cooperative, attentive, and friendly  Eye Contact:  good  Affect:  congruent  Mood: expressive  Speech: responsive  Thought Process:  linear and organized  Thought Content: appropriate and abstract  Suicidal: denies  Homicidal:  denies  Hallucinations:  denies  Memory:  intact  Orientation:  person, place, time, and situation  Reliability:  reliable  Insight:  good  Judgement:  good     Clinical Intervention       ICD-10-CM ICD-9-CM   1. Generalized anxiety disorder  F41.1 300.02   2. Major depressive disorder, recurrent episode, moderate (HCC)  " F33.1 296.32        Individual psychotherapy was provided utilizing CBT & MI techniques to restore adaptive functioning, encourage expression of thoughts and feelings, support self-esteem, establish new coping skills, manage stress, build confidence, challenge negative thinking patterns and provide support.  Therapist utilized reflective listening as patient shared current stressors.  Patient reflected on his patterns of behavior and appeared to have good insight into his symptoms.  He appeared to be significantly calmer today, stating that he notices his anxiety has decreased since our last session.  However, he recognized some cognitive distortions that he continues to struggle with.  He engages in frequent negative self-talk and states that one goal he has set for himself is to improve his self-esteem and self-confidence.  He acknowledged that going to the gym regularly has helped toward this goal.    Plan   Plan & Goals     Patient was given resources and encouraged to develop positive affirmations to practice daily in order to challenge negative self-talk and boost self-confidence. Will discuss further next session.    1. Patient acknowledged and verbally consented to continue working toward resolving current treatment plan goals and was educated on the importance of participation in the therapeutic process.  2. Patient will remain compliant with medication regimen as prescribed. Discuss any medication side effects, questions or concerns with prescribing provider.  3. Call 911 or present to the nearest emergency room in an emergency situation.  4. National Suicide Prevention Lifeline: Call 988. The Lifeline provides 24/7, free and confidential support for people in distress, prevention and crisis resources.    Return in about 2 weeks (around 3/8/2023) for Next scheduled follow up.    ____________________  This document has been electronically signed by Dai Link LCSW  February 22, 2023 17:02 EST    Part  of this note may be an electronic transcription/translation of spoken language to printed text using the Dragon Dictation System.

## 2023-02-20 ENCOUNTER — OFFICE VISIT (OUTPATIENT)
Dept: INTERNAL MEDICINE | Facility: CLINIC | Age: 48
End: 2023-02-20
Payer: MEDICAID

## 2023-02-20 VITALS
OXYGEN SATURATION: 96 % | BODY MASS INDEX: 42.55 KG/M2 | WEIGHT: 297.2 LBS | HEIGHT: 70 IN | DIASTOLIC BLOOD PRESSURE: 80 MMHG | TEMPERATURE: 99.1 F | HEART RATE: 78 BPM | SYSTOLIC BLOOD PRESSURE: 115 MMHG

## 2023-02-20 DIAGNOSIS — R73.01 IMPAIRED FASTING GLUCOSE: ICD-10-CM

## 2023-02-20 DIAGNOSIS — J45.20 MILD INTERMITTENT ASTHMA WITHOUT COMPLICATION: Chronic | ICD-10-CM

## 2023-02-20 DIAGNOSIS — E55.9 VITAMIN D INSUFFICIENCY: ICD-10-CM

## 2023-02-20 DIAGNOSIS — K21.9 GASTROESOPHAGEAL REFLUX DISEASE, UNSPECIFIED WHETHER ESOPHAGITIS PRESENT: Primary | ICD-10-CM

## 2023-02-20 DIAGNOSIS — Z00.00 ANNUAL PHYSICAL EXAM: Primary | ICD-10-CM

## 2023-02-20 DIAGNOSIS — Z72.52 HIGH RISK HOMOSEXUAL BEHAVIOR: ICD-10-CM

## 2023-02-20 LAB
HBA1C MFR BLD: 5.2 % (ref 4.8–5.6)
UREA BREATH TEST QL: NEGATIVE

## 2023-02-20 PROCEDURE — 99214 OFFICE O/P EST MOD 30 MIN: CPT | Performed by: NURSE PRACTITIONER

## 2023-02-20 PROCEDURE — 83013 H PYLORI (C-13) BREATH: CPT | Performed by: NURSE PRACTITIONER

## 2023-02-20 PROCEDURE — 83036 HEMOGLOBIN GLYCOSYLATED A1C: CPT | Performed by: NURSE PRACTITIONER

## 2023-02-20 RX ORDER — EMTRICITABINE AND TENOFOVIR DISOPROXIL FUMARATE 200; 300 MG/1; MG/1
1 TABLET, FILM COATED ORAL DAILY
COMMUNITY
Start: 2022-06-01

## 2023-02-20 NOTE — PROGRESS NOTES
"Answers for HPI/ROS submitted by the patient on 2/19/2023  What is the primary reason for your visit?: Physical    Chief Complaint  No chief complaint on file.  Subjective    History of Present Illness  Thiago Kellogg is a 47 y.o. male who presents to [facility] for His annual physical exam.         Current Outpatient Medications:   •  Albuterol Sulfate, sensor, (ProAir Digihaler) 108 (90 Base) MCG/ACT aerosol powder , Inhale As Needed., Disp: , Rfl:   •  chlorhexidine (PERIDEX) 0.12 % solution, RINSE WITH 15 ML THREE TIMES DAILY FOR ONE MINUTE THEN SPIT, Disp: , Rfl:   •  traZODone (DESYREL) 50 MG tablet, Take 1 tablet by mouth Every Night for 90 days. PRN, Disp: 30 tablet, Rfl: 2  •  Vortioxetine HBr (Trintellix) 10 MG tablet tablet, Take 2 tablets by mouth Daily With Breakfast for 28 days., Disp: 56 tablet, Rfl: 0  •  Vortioxetine HBr (Trintellix) 20 MG tablet, Take 1 tablet by mouth Daily With Breakfast for 90 days., Disp: 30 tablet, Rfl: 2  Allergies   Allergen Reactions   • Erythromycin Unknown - High Severity      Past Medical History:   Diagnosis Date   • Alcohol abuse 1997   • Anxiety    • Asthma, intrinsic c. 2018   • Depression 1995   • Hypertension February 2022   • Insomnia    • Substance abuse (HCC) 1995      Past Surgical History:   Procedure Laterality Date   • DENTAL PROCEDURE  2022        Objective   There were no vitals taken for this visit.   Estimated body mass index is 42.62 kg/m² as calculated from the following:    Height as of 2/10/23: 177.8 cm (70\").    Weight as of 2/10/23: 135 kg (297 lb).     Physical Exam  Vitals reviewed.   Constitutional:       General: He is not in acute distress.  HENT:      Head: Normocephalic and atraumatic.      Right Ear: Tympanic membrane and ear canal normal.      Left Ear: Tympanic membrane and ear canal normal.   Eyes:      Conjunctiva/sclera: Conjunctivae normal.   Cardiovascular:      Rate and Rhythm: Normal rate and " regular rhythm.      Heart sounds: Normal heart sounds. No murmur heard.  Pulmonary:      Effort: Pulmonary effort is normal.      Breath sounds: Normal breath sounds. No wheezing, rhonchi or rales.   Abdominal:      General: There is no distension.      Palpations: Abdomen is soft. There is no mass.      Tenderness: There is no abdominal tenderness.   Musculoskeletal:      Right lower leg: No edema.      Left lower leg: No edema.   Lymphadenopathy:      Cervical: No cervical adenopathy.   Skin:     General: Skin is warm and dry.      Coloration: Skin is not jaundiced or pale.   Neurological:      General: No focal deficit present.      Mental Status: He is alert.   Psychiatric:         Mood and Affect: Mood normal.         Thought Content: Thought content normal.          Result Review :  The following data was reviewed by: ELIANA Cazares on 02/20/2023:  CMP    CMP 5/27/22 2/10/23   Glucose 111 (A) 100 (A)   BUN 14 23 (A)   Creatinine 1.00 1.03   eGFR 95.2 91.3   Sodium 137 137   Potassium 4.5 4.5   Chloride 100 103   Calcium 10.2 9.7   Total Protein 8.1 7.3   Albumin 4.80 4.2   Globulin 3.3 3.1   Total Bilirubin 0.5 0.3   Alkaline Phosphatase 102 100   AST (SGOT) 20 31   ALT (SGPT) 26 25   Albumin/Globulin Ratio 1.5 1.4   BUN/Creatinine Ratio 14.0 22.3   Anion Gap 11.8 9.0   (A) Abnormal value       Comments are available for some flowsheets but are not being displayed.           CBC w/diff    CBC w/Diff 5/27/22 2/10/23   WBC 5.94 4.84   RBC 5.30 5.07   Hemoglobin 16.2 14.4   Hematocrit 45.5 43.5   MCV 85.8 85.8   MCH 30.6 28.4   MCHC 35.6 33.1   RDW 12.4 13.1   Platelets 345 330   Neutrophil Rel % 63.3 51.1   Immature Granulocyte Rel % 0.2 0.2   Lymphocyte Rel % 24.7 34.1   Monocyte Rel % 8.4 9.5   Eosinophil Rel % 2.4 3.9   Basophil Rel % 1.0 1.2           Lipid Panel    Lipid Panel 2/10/23   Total Cholesterol 178   Triglycerides 115   HDL Cholesterol 40   VLDL Cholesterol 21   LDL Cholesterol  117 (A)    LDL/HDL Ratio 2.88   (A) Abnormal value            TSH    TSH 2/10/23   TSH 3.140                           Assessment and Plan   Diagnoses and all orders for this visit:    1. Annual physical exam (Primary)  Comments:  Discussed healthy diet, exercise, adequate sleep, cancer screening, immunizations and preventative care. Annual eye exam and twice yearly dental cleaning.    2. High risk homosexual behavior    3. Impaired fasting glucose  Comments:  Check HA1C next visit.            Patient was given instructions and counseling regarding his condition or for health maintenance advice. Please see specific information pulled into the AVS if appropriate.     Follow Up   No follow-ups on file.    ELIANA Cazares

## 2023-02-20 NOTE — PROGRESS NOTES
"Chief Complaint  Heartburn (Patient is complaining of acid reflux. He does eat spicy food and caffeine. Neds results of labs and CT scan. )  Subjective      History of Present Illness  Thiago Kellogg is a 47 y.o. male who presents to [facility] for complaint of acid reflux.  He eats spicy food and caffeine. Usually this wakes him up and he also has coughing when he wakes up.  He does eat just prior to going to bed.  He used Pepcid OTC yesterday and improved but then returned.      He would also like to discuss his recent lab results and CT scan.      Past Medical History:   Diagnosis Date   • Alcohol abuse 1997   • Anxiety    • Asthma, intrinsic c. 2018   • Depression 1995   • Hypertension February 2022   • Insomnia    • Substance abuse (HCC) 1995        Past Surgical History:   Procedure Laterality Date   • DENTAL PROCEDURE  2022        Allergies   Allergen Reactions   • Erythromycin Unknown - High Severity          Current Outpatient Medications:   •  Albuterol Sulfate, sensor, (ProAir Digihaler) 108 (90 Base) MCG/ACT aerosol powder , Inhale As Needed., Disp: , Rfl:   •  chlorhexidine (PERIDEX) 0.12 % solution, RINSE WITH 15 ML THREE TIMES DAILY FOR ONE MINUTE THEN SPIT, Disp: , Rfl:   •  emtricitabine-tenofovir (TRUVADA) 200-300 MG per tablet, , Disp: , Rfl:   •  traZODone (DESYREL) 50 MG tablet, Take 1 tablet by mouth Every Night for 90 days. PRN, Disp: 30 tablet, Rfl: 2  •  Vortioxetine HBr (Trintellix) 20 MG tablet, Take 1 tablet by mouth Daily With Breakfast for 90 days., Disp: 30 tablet, Rfl: 2  •  Vortioxetine HBr (Trintellix) 10 MG tablet tablet, Take 2 tablets by mouth Daily With Breakfast for 28 days., Disp: 56 tablet, Rfl: 0    Objective   /80 (BP Location: Right arm, Patient Position: Sitting, Cuff Size: Large Adult)   Pulse 78   Temp 99.1 °F (37.3 °C) (Temporal)   Ht 177.8 cm (70\")   Wt 135 kg (297 lb 3.2 oz)   SpO2 96%   BMI 42.64 kg/m²    Estimated " "body mass index is 42.64 kg/m² as calculated from the following:    Height as of this encounter: 177.8 cm (70\").    Weight as of this encounter: 135 kg (297 lb 3.2 oz).   Physical Exam  Vitals reviewed.   Constitutional:       General: He is not in acute distress.  HENT:      Head: Normocephalic and atraumatic.   Eyes:      Conjunctiva/sclera: Conjunctivae normal.   Neck:      Comments: No thyroid enlargement  Cardiovascular:      Rate and Rhythm: Normal rate and regular rhythm.   Pulmonary:      Effort: Pulmonary effort is normal.      Breath sounds: Normal breath sounds. No wheezing, rhonchi or rales.   Abdominal:      General: There is no distension.      Palpations: Abdomen is soft. There is no mass.      Tenderness: There is no abdominal tenderness.   Musculoskeletal:      Right lower leg: No edema.      Left lower leg: No edema.   Lymphadenopathy:      Cervical: No cervical adenopathy.   Skin:     General: Skin is warm and dry.   Neurological:      General: No focal deficit present.      Mental Status: He is alert.   Psychiatric:         Thought Content: Thought content normal.        Result Review :  The following data was reviewed by: ELIANA Cazares on 02/20/2023:  CMP    CMP 5/27/22 2/10/23   Glucose 111 (A) 100 (A)   BUN 14 23 (A)   Creatinine 1.00 1.03   eGFR 95.2 91.3   Sodium 137 137   Potassium 4.5 4.5   Chloride 100 103   Calcium 10.2 9.7   Total Protein 8.1 7.3   Albumin 4.80 4.2   Globulin 3.3 3.1   Total Bilirubin 0.5 0.3   Alkaline Phosphatase 102 100   AST (SGOT) 20 31   ALT (SGPT) 26 25   Albumin/Globulin Ratio 1.5 1.4   BUN/Creatinine Ratio 14.0 22.3   Anion Gap 11.8 9.0   (A) Abnormal value       Comments are available for some flowsheets but are not being displayed.           CBC w/diff    CBC w/Diff 5/27/22 2/10/23   WBC 5.94 4.84   RBC 5.30 5.07   Hemoglobin 16.2 14.4   Hematocrit 45.5 43.5   MCV 85.8 85.8   MCH 30.6 28.4   MCHC 35.6 33.1   RDW 12.4 13.1   Platelets 345 330 "   Neutrophil Rel % 63.3 51.1   Immature Granulocyte Rel % 0.2 0.2   Lymphocyte Rel % 24.7 34.1   Monocyte Rel % 8.4 9.5   Eosinophil Rel % 2.4 3.9   Basophil Rel % 1.0 1.2           Lipid Panel    Lipid Panel 2/10/23   Total Cholesterol 178   Triglycerides 115   HDL Cholesterol 40   VLDL Cholesterol 21   LDL Cholesterol  117 (A)   LDL/HDL Ratio 2.88   (A) Abnormal value            TSH    TSH 2/10/23   TSH 3.140         Vitamin D 25 Hydroxy (02/10/2023 12:49)    Data reviewed: Radiologic studies CT Chest With Contrast Diagnostic (01/16/2023 14:19)            Assessment and Plan   Diagnoses and all orders for this visit:    1. Gastroesophageal reflux disease, unspecified whether esophagitis present (Primary)  Comments:  Advise do not eat 2 to 3 hours before bed.  Elevate head of bed 6 inches.  Use Tums, baking soda or Pepcid as needed.  H. pylori test today.  Orders:  -     H. Pylori Breath Test - Breath, Lung; Future  -     H. Pylori Breath Test - Breath, Lung    2. Mild intermittent asthma without complication  Comments:  Stable.  Using IH only about twice a month.    3. Impaired fasting glucose  Comments:  Check HA1C today and follow-up by phone.  Orders:  -     Hemoglobin A1c    4. High risk homosexual behavior  Comments:  Takes Truvada as needed.            Patient was given instructions and counseling regarding his condition. Please see specific information pulled into the AVS if appropriate.     Follow Up   Return in about 6 months (around 8/20/2023) for Annual physical.    ELIANA Cazares

## 2023-02-22 ENCOUNTER — OFFICE VISIT (OUTPATIENT)
Dept: PSYCHIATRY | Facility: CLINIC | Age: 48
End: 2023-02-22
Payer: MEDICAID

## 2023-02-22 DIAGNOSIS — F41.1 GENERALIZED ANXIETY DISORDER: Primary | ICD-10-CM

## 2023-02-22 DIAGNOSIS — F33.1 MAJOR DEPRESSIVE DISORDER, RECURRENT EPISODE, MODERATE: ICD-10-CM

## 2023-02-22 PROCEDURE — 90837 PSYTX W PT 60 MINUTES: CPT | Performed by: SOCIAL WORKER

## 2023-02-22 NOTE — PSYCHOTHERAPY NOTE
Psychotherapy Note - February 22, 2023    I felt good getting up going to work  Getting into a routine was nice    Going to the gym - feeling good about  Goal is to feel good about myself and give myself dignity    Progressing towards that goal    I'm reaping the benefits - loving the endorphins    I have something to prove    My samantha: Jean-Paul - he has been staying with me    Sleep has been regular, fairly normal, consistent hours  I'm not sleeping 12 hours or 4 hours  The window has gone from 6 - 9 hours      Anticipatory anxiety about people asking about my sexuality  When I came out it was easy for me  People were more accepting of [place] people    I was naive to the fact that people are mostly hateful of it    There is an extreme hatred

## 2023-03-10 ENCOUNTER — OFFICE VISIT (OUTPATIENT)
Dept: PSYCHIATRY | Facility: CLINIC | Age: 48
End: 2023-03-10
Payer: MEDICAID

## 2023-03-10 VITALS
BODY MASS INDEX: 42.98 KG/M2 | DIASTOLIC BLOOD PRESSURE: 78 MMHG | HEIGHT: 70 IN | WEIGHT: 300.2 LBS | SYSTOLIC BLOOD PRESSURE: 132 MMHG | HEART RATE: 80 BPM

## 2023-03-10 DIAGNOSIS — F33.1 MAJOR DEPRESSIVE DISORDER, RECURRENT EPISODE, MODERATE: Primary | ICD-10-CM

## 2023-03-10 DIAGNOSIS — F41.1 GENERALIZED ANXIETY DISORDER: ICD-10-CM

## 2023-03-10 DIAGNOSIS — F33.1 MODERATE EPISODE OF RECURRENT MAJOR DEPRESSIVE DISORDER: Primary | ICD-10-CM

## 2023-03-10 DIAGNOSIS — F51.05 INSOMNIA DUE TO MENTAL DISORDER: ICD-10-CM

## 2023-03-10 PROCEDURE — 1160F RVW MEDS BY RX/DR IN RCRD: CPT | Performed by: NURSE PRACTITIONER

## 2023-03-10 PROCEDURE — 1160F RVW MEDS BY RX/DR IN RCRD: CPT | Performed by: SOCIAL WORKER

## 2023-03-10 PROCEDURE — 99214 OFFICE O/P EST MOD 30 MIN: CPT | Performed by: NURSE PRACTITIONER

## 2023-03-10 PROCEDURE — 1159F MED LIST DOCD IN RCRD: CPT | Performed by: SOCIAL WORKER

## 2023-03-10 PROCEDURE — 1159F MED LIST DOCD IN RCRD: CPT | Performed by: NURSE PRACTITIONER

## 2023-03-10 PROCEDURE — 90837 PSYTX W PT 60 MINUTES: CPT | Performed by: SOCIAL WORKER

## 2023-03-10 NOTE — PROGRESS NOTES
Progress Note    Date: 03/10/2023  Time In: 0858  Time Out: 0959    Patient Name: Thiago Kellogg  Patient Age: 47 y.o.    CHIEF COMPLAINT: Depression, Anxiety    Subjective   History of Present Illness     From previous progress note on 2/22/23:  Patient was given resources and encouraged to develop positive affirmations to practice daily in order to challenge negative self-talk and boost self-confidence. Will discuss further next session.    Thiago Kellogg is a 47 y.o. male who presents today as a follow-up for continued psychotherapy.  Patient reports that he has fallen into a depressive episode, stating that he is not enjoying his interests as much, has not been sleeping well, has not been productive or motivated to exercise as often as he had.  He states that life stressors and circumstances contribute to his negative mindset, and he appears to be engaging in negative self talk frequently.      Assessment    Mental Status Exam     Appearance: good hygiene and dressed appropriately for the weather  Behavior: calm  Cooperation:  engaged, cooperative, attentive, and friendly  Eye Contact:  good  Affect:  flat  Mood: depressed  Speech: responsive  Thought Process:  linear and organized  Thought Content: rumination  Suicidal: denies  Homicidal:  denies  Hallucinations:  denies  Memory:  intact  Orientation:  person, place, time, and situation  Reliability:  reliable  Insight:  good  Judgement:  good     Clinical Intervention       ICD-10-CM ICD-9-CM   1. Moderate episode of recurrent major depressive disorder (HCC)  F33.1 296.32   2. Generalized anxiety disorder  F41.1 300.02        Individual psychotherapy was provided utilizing CBT techniques to provide symptom relief, encourage expression of thoughts and feelings, manage stress, recognize patterns of behavior, acknowledge sources of feelings and behaviors, challenge negative thinking patterns, practice identifying and accepting positive thoughts and provide  support.  Patient was educated on the cognitive behavioral model and seeking/acknowledging positive experiences and thoughts to improve overall mood.  Patient had good insight into his symptoms and patterns of behavior, tying in childhood experiences with his thought process in adulthood as well.  Patient tends to ruminate over negative circumstances, and often engages in negative self-talk which appears to contribute to his decline in mood. Therapist education patient on the importance of building healthy coping skills and working toward healthy functioning before focusing on past traumatic experiences, as unhelpful thoughts could cause an increase in anxiety.    Plan   Plan & Goals     Patient could benefit from focusing on self compassion and positive self-talk in order to improve productivity, decrease symptoms of depression, and to find jenny and previously enjoyable activities.    1. Patient acknowledged and verbally consented to continue working toward resolving current treatment plan goals and was educated on the importance of participation in the therapeutic process.  2. Patient will remain compliant with medication regimen as prescribed. Discuss any medication side effects, questions or concerns with prescribing provider.  3. Call 911 or present to the nearest emergency room in an emergency situation.  4. National Suicide Prevention Lifeline: Call 988. The Lifeline provides 24/7, free and confidential support for people in distress, prevention and crisis resources.    Return in about 2 weeks (around 3/24/2023) for Next scheduled follow up.    ____________________  This document has been electronically signed by Dai Link LCSW  March 10, 2023 10:09 EST    Part of this note may be an electronic transcription/translation of spoken language to printed text using the Dragon Dictation System.
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
## 2023-03-16 ENCOUNTER — OFFICE VISIT (OUTPATIENT)
Dept: PULMONOLOGY | Facility: CLINIC | Age: 48
End: 2023-03-16
Payer: MEDICAID

## 2023-03-16 VITALS
TEMPERATURE: 97.8 F | RESPIRATION RATE: 16 BRPM | WEIGHT: 298.2 LBS | SYSTOLIC BLOOD PRESSURE: 127 MMHG | HEART RATE: 75 BPM | HEIGHT: 70 IN | DIASTOLIC BLOOD PRESSURE: 83 MMHG | OXYGEN SATURATION: 95 % | BODY MASS INDEX: 42.69 KG/M2

## 2023-03-16 DIAGNOSIS — E66.01 MORBID (SEVERE) OBESITY DUE TO EXCESS CALORIES: ICD-10-CM

## 2023-03-16 DIAGNOSIS — J45.20 MILD INTERMITTENT ASTHMA WITHOUT COMPLICATION: Primary | ICD-10-CM

## 2023-03-16 PROCEDURE — 99213 OFFICE O/P EST LOW 20 MIN: CPT | Performed by: INTERNAL MEDICINE

## 2023-03-16 PROCEDURE — 1159F MED LIST DOCD IN RCRD: CPT | Performed by: INTERNAL MEDICINE

## 2023-03-16 PROCEDURE — 1160F RVW MEDS BY RX/DR IN RCRD: CPT | Performed by: INTERNAL MEDICINE

## 2023-03-16 NOTE — PROGRESS NOTES
Pulmonary Office Follow-up    Subjective     Thiago Kellogg is seen today at the office for   Chief Complaint   Patient presents with   • Asthma   • Follow-up     1 year follow-up         HPI  Thiago Kellogg is a 47 y.o. male with a PMH significant for bronchial asthma mild and obesity presents for follow-up patient denies any shortness of breath cough expectoration fever or weight loss he takes albuterol inhaler occasionally on an as-needed basis      Tobacco use history:  Former smoker      Patient Active Problem List   Diagnosis   • Asthma   • Ground glass opacity present on imaging of lung   • Anxiety       Review of Systems  Review of Systems   Respiratory: Positive for shortness of breath.    All other systems reviewed and are negative.    As described in the HPI. Otherwise, remainder of ROS (14 systems) were negative.    Medications, Allergies, Social, and Family Histories reviewed as per EMR.    Objective     Vitals:    03/16/23 1534   BP: 127/83   Pulse: 75   Resp: 16   Temp: 97.8 °F (36.6 °C)   SpO2: 95%         03/16/23  1534   Weight: 135 kg (298 lb 3.2 oz)       Physical Exam  Vitals and nursing note reviewed.   Constitutional:       Appearance: He is obese.   HENT:      Head: Normocephalic and atraumatic.      Nose: Nose normal.      Mouth/Throat:      Mouth: Mucous membranes are moist.   Eyes:      Conjunctiva/sclera: Conjunctivae normal.      Pupils: Pupils are equal, round, and reactive to light.   Cardiovascular:      Rate and Rhythm: Normal rate and regular rhythm.      Pulses: Normal pulses.      Heart sounds: Normal heart sounds.   Pulmonary:      Effort: Pulmonary effort is normal.      Breath sounds: Normal breath sounds.   Abdominal:      General: Abdomen is flat. Bowel sounds are normal.      Palpations: Abdomen is soft.   Musculoskeletal:         General: Normal range of motion.      Cervical back: Normal range of motion and neck supple.   Skin:     General: Skin is warm.       Capillary Refill: Capillary refill takes less than 2 seconds.   Neurological:      General: No focal deficit present.      Mental Status: He is alert and oriented to person, place, and time.   Psychiatric:         Mood and Affect: Mood normal.         CT Chest With Contrast Diagnostic    Result Date: 1/16/2023   Normal examination.    CLAUDIO PETERS MD       Electronically Signed and Approved By: CLAUDIO PETERS MD on 1/16/2023 at 15:06                Assessment & Plan     Diagnoses and all orders for this visit:    1. Mild intermittent asthma without complication (Primary)    2. Morbid (severe) obesity due to excess calories (HCC)         Discussion/ Recommendations:   Patient has already quit smoking  His examination is normal except for obesity  Advised to reduce weight  CT scan reviewed no infiltrates or abnormality noted  Patient was reassured  Vaccinations discussed and recommended    Class 3 Severe Obesity (BMI >=40). Obesity-related health conditions include the following: hypertension. Obesity is unchanged. BMI is is above average; BMI management plan is completed. We discussed low calorie, low carb based diet program, portion control and increasing exercise.        Return if symptoms worsen or fail to improve.          This document has been electronically signed by Andrew Carlson MD on March 16, 2023 15:41 EDT

## 2023-03-17 NOTE — PROGRESS NOTES
"Progress Note    Date: 03/23/2023  Time In: 0758  Time Out: 0857    Patient Legal Name: Thiago Kellogg  Patient Age: 47 y.o.    CHIEF COMPLAINT: Anxiety, Depression    Subjective   History of Present Illness     From previous progress note on 3/10/23:  Patient could benefit from focusing on self compassion and positive self-talk in order to improve productivity, decrease symptoms of depression, and to find jenny and previously enjoyable activities.    Thiago is a 47 y.o. male who presents today as a follow-up for continued psychotherapy.  Patient reports that he has been going to the gym regularly, and has been trying to prioritize self-care in order to reduce symptoms of anxiety and depression.  He states that he is compliant with his medication, and feels that it is effective.      Assessment    Mental Status Exam     Appearance: good hygiene and dressed appropriately for the weather  Behavior: calm  Cooperation:  engaged, cooperative, attentive, and friendly  Eye Contact:  good  Affect:  congruent  Mood: anxious  Speech: responsive  Thought Process:  linear and organized  Thought Content: intrusive thoughts  Suicidal: denies  Homicidal:  denies  Hallucinations:  denies  Memory:  intact  Orientation:  person, place, time, and situation  Reliability:  reliable  Insight:  good  Judgment:  good     Clinical Intervention       ICD-10-CM ICD-9-CM   1. Generalized anxiety disorder  F41.1 300.02   2. Moderate episode of recurrent major depressive disorder (HCC)  F33.1 296.32        Individual psychotherapy was provided utilizing CBT techniques to provide symptom relief, encourage expression of thoughts and feelings, manage stress, recognize patterns of behavior, challenge negative thinking patterns, recognize cognitive distortions and provide support.  Patient reflected on recent and past stressors, identifying bullying throughout childhood as a contributor to his symptoms of anxiety.  He described having \"defense " "mechanisms\" in place when he meets new people, and prior to having new social interactions.  Therapist encouraged patient to identify negative thought patterns or cognitive distortions which could be contributing to his symptoms.    Plan   Plan & Goals     Patient is to continue prioritizing self-care, and bringing awareness to how his past experiences may be affecting him and contributing to symptoms. Will discuss further next session.    1. Patient acknowledged and verbally consented to continue working toward resolving current treatment plan goals and was educated on the importance of participation in the therapeutic process.  2. Patient will remain compliant with medication regimen as prescribed. Discuss any medication side effects, questions or concerns with prescribing provider.  3. Call 911 or present to the nearest emergency room in an emergency situation.  4. National Suicide Prevention Lifeline: Call 988. The Lifeline provides 24/7, free and confidential support for people in distress, prevention and crisis resources.    Return in about 2 weeks (around 4/6/2023) for Next scheduled follow up.    ____________________  This document has been electronically signed by Dai Link LCSW  March 23, 2023 09:01 EDT    Part of this note may be an electronic transcription/translation of spoken language to printed text using the Dragon Dictation System.  "

## 2023-03-23 ENCOUNTER — OFFICE VISIT (OUTPATIENT)
Dept: PSYCHIATRY | Facility: CLINIC | Age: 48
End: 2023-03-23
Payer: MEDICAID

## 2023-03-23 DIAGNOSIS — F33.1 MODERATE EPISODE OF RECURRENT MAJOR DEPRESSIVE DISORDER: ICD-10-CM

## 2023-03-23 DIAGNOSIS — F41.1 GENERALIZED ANXIETY DISORDER: Primary | ICD-10-CM

## 2023-03-23 PROCEDURE — 90837 PSYTX W PT 60 MINUTES: CPT | Performed by: SOCIAL WORKER

## 2023-03-23 PROCEDURE — 1159F MED LIST DOCD IN RCRD: CPT | Performed by: SOCIAL WORKER

## 2023-03-23 PROCEDURE — 1160F RVW MEDS BY RX/DR IN RCRD: CPT | Performed by: SOCIAL WORKER
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
## 2023-03-24 NOTE — PROGRESS NOTES
"Progress Note    Date: 04/06/2023  Time In: 1600  Time Out: 1655    Patient Legal Name: Thiago Kellogg  Patient Age: 47 y.o.    CHIEF COMPLAINT: Depression, Anxiety    Subjective   History of Present Illness     From previous progress note on 3/23/23:  Patient is to continue prioritizing self-care, and bringing awareness to how his past experiences may be affecting him and contributing to symptoms. Will discuss further next session.    Thiago is a 47 y.o. male who presents today as a follow-up for continued psychotherapy.  Patient reports that since our last session, he has been reflecting on long-term goals that he has for himself.  He states that he continues to feel depressed (low mood, lack of motivation, less interest in previously enjoyable activities), but acknowledges that it comes and \"cycles\" and that his symptoms of anxiety present in the same cyclic way.       Assessment    Mental Status Exam     Appearance: good hygiene and dressed appropriately for the weather  Behavior: calm  Cooperation:  engaged, cooperative, attentive, and friendly  Eye Contact:  good  Affect:  congruent  Mood: expressive  Speech: responsive  Thought Process:  linear and organized  Thought Content: appropriate and abstract  Suicidal: denies  Homicidal:  denies  Hallucinations:  denies  Memory:  intact  Orientation:  person, place, time, and situation  Reliability:  reliable  Insight:  good  Judgment:  good     Clinical Intervention       ICD-10-CM ICD-9-CM   1. Moderate episode of recurrent major depressive disorder  F33.1 296.32   2. Generalized anxiety disorder  F41.1 300.02        Individual psychotherapy was provided utilizing MI techniques to encourage expression of thoughts and feelings, assess readiness for change, encourage change talk, identify goals for treatment, manage stress, recognize patterns of behavior, identify strengths, build confidence and provide support.  Therapist encouraged patient to engage in change " talk, identifying short-term and long-term goals for himself, and to bring awareness to potential changes that he can make to improve his life in various ways.  Patient had some difficulty with this task, but after prompting from therapist, patient was able to reflect on goals that he has, improvements he wants to make, and ways in which he can elicit change.    Plan   Plan & Goals     Patient is to practice positive body language to encourage more communication with others, and set short-term objectives to work towards long-term goals, as discussed in session.  We will discuss further next session.    1. Patient acknowledged and verbally consented to continue working toward resolving current treatment plan goals and was educated on the importance of participation in the therapeutic process.  2. Patient will remain compliant with medication regimen as prescribed. Discuss any medication side effects, questions or concerns with prescribing provider.  3. Call 911 or present to the nearest emergency room in an emergency situation.  4. National Suicide Prevention Lifeline: Call 988. The Lifeline provides 24/7, free and confidential support for people in distress, prevention and crisis resources.    Return in about 2 weeks (around 4/20/2023) for Next scheduled follow up.    ____________________  This document has been electronically signed by Dai Link LCSW  April 6, 2023 17:30 EDT    Part of this note may be an electronic transcription/translation of spoken language to printed text using the Dragon Dictation System.

## 2023-04-06 ENCOUNTER — OFFICE VISIT (OUTPATIENT)
Dept: PSYCHIATRY | Facility: CLINIC | Age: 48
End: 2023-04-06
Payer: MEDICAID

## 2023-04-06 DIAGNOSIS — F41.1 GENERALIZED ANXIETY DISORDER: ICD-10-CM

## 2023-04-06 DIAGNOSIS — F33.1 MODERATE EPISODE OF RECURRENT MAJOR DEPRESSIVE DISORDER: Primary | ICD-10-CM

## 2023-04-06 PROCEDURE — 1159F MED LIST DOCD IN RCRD: CPT | Performed by: SOCIAL WORKER

## 2023-04-06 PROCEDURE — 90837 PSYTX W PT 60 MINUTES: CPT | Performed by: SOCIAL WORKER

## 2023-04-06 PROCEDURE — 1160F RVW MEDS BY RX/DR IN RCRD: CPT | Performed by: SOCIAL WORKER

## 2023-04-06 NOTE — PSYCHOTHERAPY NOTE
Psychotherapy Note - April 6, 2023    Partner - Jean-Paul - met online (moved from [place] to [place] in 2021 to be closer to him)    ___________    **defense mechanisms - able to recognize things objectively (being able to say to myself that it isn't about me)  **I already have shields around me and feeling defensive    I feel lonely and also like no one would relate    I used to see people after work and spend time with them    Body language  Long-term goals  More approachable demeanor

## 2023-04-11 NOTE — PROGRESS NOTES
"Progress Note    Date: 04/20/2023  Time In: 1600  Time Out: 1657    Patient Legal Name: Thiago Kellogg  Patient Age: 47 y.o.    CHIEF COMPLAINT: Anxiety, Depression    Subjective   History of Present Illness     From previous progress note on 4/6/23:  Patient is to practice positive body language to encourage more communication with others, and set short-term objectives to work towards long-term goals, as discussed in session.  We will discuss further next session.    Thiago is a 47 y.o. male who presents today as a follow-up for continued psychotherapy.  Patient reports that since last session, he has stopped drinking caffeine, which he states has reduced his intrusive thoughts.  Patient states that he was drinking a large amount of caffeine regularly, but has not had any in 10 days, and states that he has noticed a significant improvement in his mood.  He reports that he is setting goals for himself and is motivated to work towards these goals.  He states overall he feels \"very good.\"      Assessment    Mental Status Exam     Appearance: good hygiene and dressed appropriately for the weather  Behavior: calm  Cooperation:  engaged, cooperative, attentive, and friendly  Eye Contact:  good  Affect:  congruent  Mood: anxious  Speech: responsive  Thought Process:  linear and organized  Thought Content: appropriate and abstract  Suicidal: denies  Homicidal:  denies  Hallucinations:  denies  Memory:  intact  Orientation:  person, place, time, and situation  Reliability:  reliable  Insight:  good  Judgment:  good     Clinical Intervention       ICD-10-CM ICD-9-CM   1. Generalized anxiety disorder  F41.1 300.02   2. Mild episode of recurrent major depressive disorder  F33.0 296.31        Individual psychotherapy was provided utilizing CBT & SFT techniques to promote problem-solving, encourage expression of thoughts and feelings, challenge avoidance, recognize patterns of behavior, acknowledge sources of feelings and " behaviors, challenge negative thinking patterns, recognize cognitive distortions and provide support.  Therapist provided psychoeducation on the cycle of depression and anxiety, and encouraged patient to identify specific triggers to his mood changes.  Patient reflected on current and past interpersonal relationships, and had good insight into his symptoms and patterns of behavior, especially related to his intrusive thoughts and the need for non-prescribed substances (i.e. THC, nicotine, caffeine, etc) at different times in his life.  Patient appears to have more emotive body language today, and appears to be internally motivated to make changes in his life.     Plan   Plan & Goals     Patient plans to apply for a new job before our next session, and states that he will continue to work on positive body language to appear more approachable and to engage in conversation with others, it could further reduce symptoms of depression and anxiety. Will discuss further next session.    1. Patient acknowledged and verbally consented to continue working toward resolving current treatment plan goals and was educated on the importance of participation in the therapeutic process.  2. Patient will remain compliant with medication regimen as prescribed. Discuss any medication side effects, questions or concerns with prescribing provider.  3. Call 911 or present to the nearest emergency room in an emergency situation.  4. National Suicide Prevention Lifeline: Call 988. The Lifeline provides 24/7, free and confidential support for people in distress, prevention and crisis resources.    Return in about 2 weeks (around 5/4/2023) for Next scheduled follow up.    ____________________  This document has been electronically signed by Dai Link LCSW  April 20, 2023 17:11 EDT    Part of this note may be an electronic transcription/translation of spoken language to printed text using the Dragon Dictation System.

## 2023-04-20 ENCOUNTER — OFFICE VISIT (OUTPATIENT)
Dept: PSYCHIATRY | Facility: CLINIC | Age: 48
End: 2023-04-20
Payer: MEDICAID

## 2023-04-20 VITALS
BODY MASS INDEX: 42.09 KG/M2 | HEART RATE: 77 BPM | SYSTOLIC BLOOD PRESSURE: 129 MMHG | DIASTOLIC BLOOD PRESSURE: 72 MMHG | WEIGHT: 294 LBS | HEIGHT: 70 IN

## 2023-04-20 DIAGNOSIS — F41.1 GENERALIZED ANXIETY DISORDER: Primary | ICD-10-CM

## 2023-04-20 DIAGNOSIS — F51.05 INSOMNIA DUE TO MENTAL DISORDER: ICD-10-CM

## 2023-04-20 DIAGNOSIS — F33.1 MAJOR DEPRESSIVE DISORDER, RECURRENT EPISODE, MODERATE: Primary | ICD-10-CM

## 2023-04-20 DIAGNOSIS — F33.0 MILD EPISODE OF RECURRENT MAJOR DEPRESSIVE DISORDER: ICD-10-CM

## 2023-04-20 DIAGNOSIS — F41.1 GENERALIZED ANXIETY DISORDER: ICD-10-CM

## 2023-04-20 RX ORDER — TRAZODONE HYDROCHLORIDE 50 MG/1
50 TABLET ORAL NIGHTLY
Qty: 30 TABLET | Refills: 2 | Status: SHIPPED | OUTPATIENT
Start: 2023-05-08 | End: 2023-08-06

## 2023-04-20 RX ORDER — VORTIOXETINE 20 MG/1
20 TABLET, FILM COATED ORAL
Qty: 30 TABLET | Refills: 2 | Status: SHIPPED | OUTPATIENT
Start: 2023-05-08 | End: 2023-08-06
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
## 2023-04-20 NOTE — PSYCHOTHERAPY NOTE
Psychotherapy Note - April 20, 2023    Partner - Jean-Paul - met online (moved from [place] to [place] in 2021 to be closer to him)     ___________    10 days - no caffeine  Did have sleeping issues - they subsided a little prior to this  Unintentionally - sick for 5-6 days (in bed, fever, feeling bad)  Was drinking red bull (starbucks double shots) - brewed coffee  It's been that way for years - upwards of 20 years   I have cravings sometimes  Not drinking anything - I have a bunch at home    Fear of judgment was at bay    Since I got sick, I haven't been going to the gym  I try to get on the treadmill at home (used it a couple of times)    No need for a streak - relieving the pressure   Not going to the gym and not burning as many calories - not as depleted  Not have a craving for sugar    I'm on the verge of a breakthrough    Talked to my mom last week in [place] - we talk once a week  If I need serious advice, then mom is the one    I still talk to my ex-boyfriend, Efrain - who I was with for 9 years  He is a support for me  We had a few cats together (when we broke up, the other cat went with him)  We talk every couple of weeks  He left me - it was a big blowout - the relationship was over 1-2 years prior to that  We were growing apart - we lived together    He moved out of the house - I owned the house and lived by myself for a year  Efrain moved back to [place] (where he was originally from - with friends and family)    He was very dramatic and avoiding conflict  He wanted me to fight for the relationship and he would be dramatic  We would fight and he would threaten to leave  He had no plan and didn't have a place to stay long-term  I called him and we worked it out - did it for another 6 months

## 2023-04-28 NOTE — PROGRESS NOTES
"Progress Note    Date: 05/04/2023  Time In: 1603  Time Out: 8409    Patient Legal Name: Thiaog Kellogg  Patient Age: 47 y.o.    CHIEF COMPLAINT: Anxiety, Depression    Subjective   History of Present Illness     From previous progress note on 4/20/23:  Patient plans to apply for a new job before our next session, and states that he will continue to work on positive body language to appear more approachable and to engage in conversation with others, it could further reduce symptoms of depression and anxiety. Will discuss further next session.    Thiago is a 47 y.o. male who presents today as a follow-up for continued psychotherapy.  Patient reports that he is motivated to start working at the [employer] again, as he has been feeling that his anxiety is under control and that his mood is \"stable.\"  He states that he called to get a sooner appointment with his prescribing provider to discuss additional medications to \"help a little bit more with anxiety.\"  However, patient states that he feels that his behavioral changes have been making an improvement, and he is more aware of his intrusive thoughts, as well as coping strategies to manage them as they come.      Assessment    Mental Status Exam     Appearance: good hygiene and dressed appropriately for the weather  Behavior: calm  Cooperation:  engaged, cooperative, attentive, and friendly  Eye Contact:  good  Affect:  congruent  Mood: anxious  Speech: responsive  Thought Process:  linear and organized  Thought Content: intrusive thoughts  Suicidal: denies  Homicidal:  denies  Hallucinations:  denies  Memory:  intact  Orientation:  person, place, time, and situation  Reliability:  reliable  Insight:  good  Judgment:  good     Clinical Intervention       ICD-10-CM ICD-9-CM   1. Generalized anxiety disorder  F41.1 300.02   2. Major depressive disorder, recurrent episode, moderate  F33.1 296.32        Individual psychotherapy was provided utilizing MI & CBT techniques to " encourage expression of thoughts and feelings, encourage change talk, manage stress, recognize patterns of behavior, acknowledge sources of feelings and behaviors, challenge negative thinking patterns and provide support.  Therapist provided psychoeducation on the importance of recognizing intrusive thoughts in order to practice mindfulness as an adaptive coping mechanism to manage mood.  Patient states that he previously practiced meditation regularly, and found it ineffective at managing anxiety long-term.  Therapist encouraged patient to practice mindfulness, progressive muscle relaxation, and continuing to bring awareness to body language in order to seem more approachable.  Patient reflected on potential barriers to reaching his goals, but overall states that he feels more confident in himself to manage symptoms of anxiety.    Plan   Plan & Goals     Patient is to continue practicing mindfulness, and utilizing healthy communication skills to set appropriate boundaries with others, and to prioritize self-care, including monitoring how comfortable he is with emotional vulnerability, especially with new relationships (i.e. friends, coworkers, etc.).  We will discuss further next session.    1. Patient acknowledged and verbally consented to continue working toward resolving current treatment plan goals and was educated on the importance of participation in the therapeutic process.  2. Patient will remain compliant with medication regimen as prescribed. Discuss any medication side effects, questions or concerns with prescribing provider.  3. Call 911 or present to the nearest emergency room in an emergency situation.  4. National Suicide Prevention Lifeline: Call 988. The Lifeline provides 24/7, free and confidential support for people in distress, prevention and crisis resources.    Return in about 2 weeks (around 5/18/2023) for Next scheduled follow up.    ____________________  This document has been  electronically signed by Dai Link LCSW  May 4, 2023 17:09 EDT    Part of this note may be an electronic transcription/translation of spoken language to printed text using the Dragon Dictation System.

## 2023-05-04 ENCOUNTER — OFFICE VISIT (OUTPATIENT)
Dept: PSYCHIATRY | Facility: CLINIC | Age: 48
End: 2023-05-04
Payer: MEDICAID

## 2023-05-04 DIAGNOSIS — F41.1 GENERALIZED ANXIETY DISORDER: Primary | ICD-10-CM

## 2023-05-04 DIAGNOSIS — F33.1 MAJOR DEPRESSIVE DISORDER, RECURRENT EPISODE, MODERATE: ICD-10-CM

## 2023-05-04 NOTE — PSYCHOTHERAPY NOTE
Psychotherapy Note - May 4, 2023    Partner - Jean-Paul - met online (moved from [place] to [place] in 2021 to be closer to him)    I still talk to my ex-boyfriend, Efrain - who I was with for 9 years    Talked to my mom last week in [place] - we talk once a week     ___________       10 days - no caffeine  Did have sleeping issues - they subsided a little prior to this  Unintentionally - sick for 5-6 days (in bed, fever, feeling bad)  Was drinking red bull (starbucks double shots) - brewed coffee  It's been that way for years - upwards of 20 years   I have cravings sometimes  Not drinking anything - I have a bunch at home    ________      Went to [employer] on a Friday night  I felt completely comfortable    I start to have a panic attack and I can keep it at bay  Even if I think the people notice  At that point, I'm not listening to what they're saying    I'm not interested in sharing my life story  I'm not wondering about them    I was 22 years old when I came out to everyone  First [employer] job was in [place] ([place])  There was a lot to do for [employer] - [employer]  At my first job, I was confident (I was 24)    Not forcing myself to workout  I'm going to the gym still - not hitting a certain goal every time    I've been feeling really good for the past few weeks

## 2023-05-09 ENCOUNTER — OFFICE VISIT (OUTPATIENT)
Dept: PSYCHIATRY | Facility: CLINIC | Age: 48
End: 2023-05-09
Payer: MEDICAID

## 2023-05-09 VITALS
HEART RATE: 80 BPM | DIASTOLIC BLOOD PRESSURE: 70 MMHG | WEIGHT: 290 LBS | HEIGHT: 70 IN | BODY MASS INDEX: 41.52 KG/M2 | SYSTOLIC BLOOD PRESSURE: 119 MMHG

## 2023-05-09 DIAGNOSIS — F41.1 GENERALIZED ANXIETY DISORDER: ICD-10-CM

## 2023-05-09 DIAGNOSIS — F51.05 INSOMNIA DUE TO MENTAL DISORDER: ICD-10-CM

## 2023-05-09 DIAGNOSIS — F33.1 MAJOR DEPRESSIVE DISORDER, RECURRENT EPISODE, MODERATE: Primary | ICD-10-CM

## 2023-05-09 RX ORDER — BUSPIRONE HYDROCHLORIDE 5 MG/1
5 TABLET ORAL 3 TIMES DAILY PRN
Qty: 90 TABLET | Refills: 0 | Status: SHIPPED | OUTPATIENT
Start: 2023-05-09
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
## 2023-06-07 ENCOUNTER — TELEPHONE (OUTPATIENT)
Dept: INTERNAL MEDICINE | Facility: CLINIC | Age: 48
End: 2023-06-07
Payer: MEDICAID

## 2023-06-07 DIAGNOSIS — L91.8 SKIN TAG: Primary | ICD-10-CM

## 2023-06-08 DIAGNOSIS — F41.1 GENERALIZED ANXIETY DISORDER: ICD-10-CM

## 2023-06-08 RX ORDER — BUSPIRONE HYDROCHLORIDE 5 MG/1
5 TABLET ORAL 3 TIMES DAILY PRN
Qty: 90 TABLET | Refills: 0 | Status: SHIPPED | OUTPATIENT
Start: 2023-06-08

## 2023-06-08 NOTE — TELEPHONE ENCOUNTER
Pt came into office to request medication refill for Buspirone 5mg.     busPIRone (BUSPAR) 5 MG tablet (05/09/2023)     Pt has upcoming appt  Appointment with Gustavo Jeffers APRN (06/20/2023)     Medication order pended.

## 2023-07-25 ENCOUNTER — TELEMEDICINE (OUTPATIENT)
Dept: PSYCHIATRY | Facility: CLINIC | Age: 48
End: 2023-07-25
Payer: MEDICAID

## 2023-07-25 DIAGNOSIS — F41.1 GENERALIZED ANXIETY DISORDER: ICD-10-CM

## 2023-07-25 DIAGNOSIS — F33.1 MAJOR DEPRESSIVE DISORDER, RECURRENT EPISODE, MODERATE: Primary | ICD-10-CM

## 2023-07-25 DIAGNOSIS — F51.05 INSOMNIA DUE TO MENTAL DISORDER: ICD-10-CM
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
## 2023-08-15 DIAGNOSIS — F33.1 MAJOR DEPRESSIVE DISORDER, RECURRENT EPISODE, MODERATE: ICD-10-CM

## 2023-08-15 RX ORDER — VORTIOXETINE 20 MG/1
TABLET, FILM COATED ORAL
Qty: 30 TABLET | Refills: 2 | Status: SHIPPED | OUTPATIENT
Start: 2023-08-15

## 2023-08-17 NOTE — PROGRESS NOTES
Progress Note    Date: 08/23/2023  Time In: 0902  Time Out: 0957    Patient Legal Name: Thiago Kellogg  Patient Age: 47 y.o.    CHIEF COMPLAINT: Depression (improving), Anxiety    Subjective   History of Present Illness     From previous progress note on 5/4/23:  Patient is to continue practicing mindfulness, and utilizing healthy communication skills to set appropriate boundaries with others, and to prioritize self-care, including monitoring how comfortable he is with emotional vulnerability, especially with new relationships (i.e. friends, coworkers, etc.).  We will discuss further next session.      Assessment    Mental Status Exam     Appearance: good hygiene and dressed appropriately for the weather  Behavior: calm  Cooperation:  engaged, cooperative, attentive, and friendly  Eye Contact:  good  Affect:  congruent  Mood: expressive  Speech: responsive  Thought Process:  linear, organized, and goal-oriented  Thought Content: appropriate and abstract  Suicidal: denies  Homicidal:  denies  Hallucinations:  denies  Memory:  intact  Orientation:  person, place, time, and situation  Reliability:  reliable  Insight:  good  Judgment:  good     Clinical Intervention       ICD-10-CM ICD-9-CM   1. Generalized anxiety disorder  F41.1 300.02   2. Recurrent major depressive disorder, in partial remission  F33.41 296.35        Thiago is a 47 y.o. male who presents today as a follow-up for continued psychotherapy. Individual psychotherapy was provided utilizing SFT & CBT techniques to restore adaptive functioning, encourage expression of thoughts and feelings, increase acceptance, manage stress, recognize patterns of behavior, acknowledge sources of feelings and behaviors, assess symptoms, challenge negative thinking patterns, and provide support.  Therapist provided support as patient reflected on his level of functioning and mood management recently.  He states that his symptoms of anxiety have decreased since our last  session (May 2023), as he has been working at a [employer] for the last 2 months, and feels that he is able to prioritize self-care.  He states that he has been working out regularly, which has been beneficial to him as well.  He indicates that he still struggles with some intrusive thoughts, especially when he feels pressure to socialize with coworkers, but denies that it is debilitating or has led to any panic attacks.  He reports that his confidence is higher as well.  Therapist encouraged patient to practice cognitive reframing, and to problem solve, especially when new stressors arise.    Plan   Plan & Goals     Patient is to continue practicing positive body language, to appear approachable and to engage in social situations more frequently, as discussed in session.  However, patient is to continue practicing self compassion and to give himself permission to relax and prioritize his own needs in the same settings.  We will discuss further next session.    Patient acknowledged and verbally consented to continue working toward resolving current treatment plan goals and was educated on the importance of participation in the therapeutic process.  Patient will remain compliant with medication regimen as prescribed. Discuss any medication side effects, questions or concerns with prescribing provider.  Call 911 or present to the nearest emergency room in an emergency situation.  National Suicide Prevention Lifeline: Call 988. The Lifeline provides 24/7, free and confidential support for people in distress, prevention and crisis resources.    Return in about 2 weeks (around 9/6/2023) for Next scheduled follow up.    ____________________  This document has been electronically signed by Dai Link LCSW  August 23, 2023 10:08 EDT    Part of this note may be an electronic transcription/translation of spoken language to printed text using the Dragon Dictation System.

## 2023-08-23 ENCOUNTER — OFFICE VISIT (OUTPATIENT)
Dept: PSYCHIATRY | Facility: CLINIC | Age: 48
End: 2023-08-23
Payer: MEDICAID

## 2023-08-23 DIAGNOSIS — F33.41 RECURRENT MAJOR DEPRESSIVE DISORDER, IN PARTIAL REMISSION: ICD-10-CM

## 2023-08-23 DIAGNOSIS — F41.1 GENERALIZED ANXIETY DISORDER: Primary | ICD-10-CM

## 2023-08-23 NOTE — PSYCHOTHERAPY NOTE
Psychotherapy Note - 2023    Partner - Jean-Paul - met online (moved from [place] to [place] in  to be closer to him)     I still talk to my ex-boyfriend, Efrain - who I was with for 9 years     Talked to my mom last week in [place] - we talk once a week     ___________      I was started part-time but they were scheduling me 4 days per week (almost 9 hrs) - I've asked them about switching full-time so I could get benefits     work best to schedule    It's usually   Gym almost every day  Not being obsessive about it    **WANTS TO TALK ABOUT FRIEND WHO  BY SUICIDE (years ago)  REALLY GOOD FRIEND   HAS GUILT ABOUT IT

## 2023-09-06 NOTE — PROGRESS NOTES
"Chief Complaint  Annual Exam (Vaccines, screenings./)  Subjective    History of Present Illness  Thiago Kellogg is a 47 y.o. male who presents to [facility] for His annual physical exam.     Following with Behavioral Health.       Current Outpatient Medications:     Albuterol Sulfate, sensor, (ProAir Digihaler) 108 (90 Base) MCG/ACT aerosol powder , Inhale 2 puffs As Needed (wheezing)., Disp: 1 each, Rfl: 2    busPIRone (BUSPAR) 7.5 MG tablet, Take 1 tablet by mouth 3 (Three) Times a Day for 90 days., Disp: 90 tablet, Rfl: 2    emtricitabine-tenofovir (TRUVADA) 200-300 MG per tablet, Take 1 tablet by mouth Daily. 2 TABLETS DAY OF, 1 TABLET 24 HRS LATER, 1 TABLET 24 HRS AFTER THAT FOR PREVENTION AS NEEDED, Disp: , Rfl:     Trintellix 20 MG tablet, TAKE 1 TABLET BY MOUTH DAILY WITH BREAKFAST, Disp: 30 tablet, Rfl: 2    Creatine powder, Use., Disp: , Rfl:   Allergies   Allergen Reactions    Erythromycin Unknown - High Severity      Past Medical History:   Diagnosis Date    Alcohol abuse 1997    Anxiety     Asthma, intrinsic c. 2018    Depression 1995    Hypertension February 2022    Insomnia     Substance abuse 1995      Past Surgical History:   Procedure Laterality Date    DENTAL PROCEDURE  2022        Objective   /78 (BP Location: Right arm, Patient Position: Sitting, Cuff Size: Adult)   Pulse 61   Temp 97.8 °F (36.6 °C) (Temporal)   Ht 177.8 cm (70\")   Wt 82.6 kg (182 lb)   SpO2 98%   BMI 26.11 kg/m²    Estimated body mass index is 26.11 kg/m² as calculated from the following:    Height as of this encounter: 177.8 cm (70\").    Weight as of this encounter: 82.6 kg (182 lb).     Physical Exam  Vitals reviewed.   Constitutional:       General: He is not in acute distress.  HENT:      Head: Normocephalic and atraumatic.      Right Ear: Tympanic membrane and ear canal normal.      Left Ear: Tympanic membrane and ear canal normal.   Eyes:      Conjunctiva/sclera: " Conjunctivae normal.   Cardiovascular:      Rate and Rhythm: Normal rate and regular rhythm.      Heart sounds: Normal heart sounds. No murmur heard.  Pulmonary:      Effort: Pulmonary effort is normal.      Breath sounds: Normal breath sounds. No wheezing, rhonchi or rales.   Abdominal:      General: There is no distension.      Palpations: Abdomen is soft. There is no mass.      Tenderness: There is no abdominal tenderness.   Musculoskeletal:      Right lower leg: No edema.      Left lower leg: No edema.   Lymphadenopathy:      Cervical: No cervical adenopathy.   Skin:     General: Skin is warm and dry.      Coloration: Skin is not jaundiced or pale.   Neurological:      General: No focal deficit present.      Mental Status: He is alert.   Psychiatric:         Mood and Affect: Mood normal.         Thought Content: Thought content normal.               Assessment and Plan   Diagnoses and all orders for this visit:    1. Annual physical exam (Primary)  -     CBC & Differential; Future  -     Comprehensive Metabolic Panel; Future  -     TSH; Future  -     Lipid Panel; Future  -     Testosterone, Free, Total; Future    2. Vitamin D insufficiency  -     Vitamin D,25-Hydroxy; Future    3. Mild intermittent asthma without complication    4. Impaired fasting glucose  -     Hemoglobin A1c; Future    5. Need for influenza vaccination  -     Fluzone >6 Months ([id number])    6. Screening for malignant neoplasm of prostate  -     PSA Screen; Future    Other orders  -     Albuterol Sulfate, sensor, (ProAir Digihaler) 108 (90 Base) MCG/ACT aerosol powder ; Inhale 2 puffs As Needed (wheezing).  Dispense: 1 each; Refill: 2      Annual exam: Discussed healthy diet, exercise, adequate sleep, cancer screening, immunizations and preventative care. Annual eye exam and twice yearly dental cleaning.    Patient was given instructions and counseling regarding his condition or for health maintenance advice. Please see specific information  pulled into the AVS if appropriate.     Follow Up   Return in about 1 year (around 9/11/2024) for Annual physical.    Dictated Utilizing Dragon Dictation.  Please note that portions of this note were completed with a voice recognition program.  Part of this note may be an electronic transcription/translation of spoken language to printed text using the Dragon Dictation System.    ELIANA Cazares

## 2023-09-11 ENCOUNTER — OFFICE VISIT (OUTPATIENT)
Dept: INTERNAL MEDICINE | Facility: CLINIC | Age: 48
End: 2023-09-11
Payer: MEDICAID

## 2023-09-11 VITALS
HEART RATE: 61 BPM | TEMPERATURE: 97.8 F | HEIGHT: 70 IN | WEIGHT: 182 LBS | SYSTOLIC BLOOD PRESSURE: 124 MMHG | DIASTOLIC BLOOD PRESSURE: 78 MMHG | BODY MASS INDEX: 26.05 KG/M2 | OXYGEN SATURATION: 98 %

## 2023-09-11 DIAGNOSIS — E55.9 VITAMIN D INSUFFICIENCY: ICD-10-CM

## 2023-09-11 DIAGNOSIS — Z00.00 ANNUAL PHYSICAL EXAM: Primary | ICD-10-CM

## 2023-09-11 DIAGNOSIS — J45.20 MILD INTERMITTENT ASTHMA WITHOUT COMPLICATION: ICD-10-CM

## 2023-09-11 DIAGNOSIS — Z23 NEED FOR INFLUENZA VACCINATION: ICD-10-CM

## 2023-09-11 DIAGNOSIS — Z12.5 SCREENING FOR MALIGNANT NEOPLASM OF PROSTATE: ICD-10-CM

## 2023-09-11 DIAGNOSIS — R73.01 IMPAIRED FASTING GLUCOSE: ICD-10-CM

## 2023-09-11 PROCEDURE — 1160F RVW MEDS BY RX/DR IN RCRD: CPT | Performed by: NURSE PRACTITIONER

## 2023-09-11 PROCEDURE — 90686 IIV4 VACC NO PRSV 0.5 ML IM: CPT | Performed by: NURSE PRACTITIONER

## 2023-09-11 PROCEDURE — 90471 IMMUNIZATION ADMIN: CPT | Performed by: NURSE PRACTITIONER

## 2023-09-11 PROCEDURE — 99396 PREV VISIT EST AGE 40-64: CPT | Performed by: NURSE PRACTITIONER

## 2023-09-11 PROCEDURE — 1159F MED LIST DOCD IN RCRD: CPT | Performed by: NURSE PRACTITIONER

## 2023-09-11 RX ORDER — CREATINE 100 %
POWDER (GRAM) MISCELLANEOUS
COMMUNITY

## 2023-09-11 RX ORDER — ALBUTEROL SULFATE 90 UG/1
2 INHALANT RESPIRATORY (INHALATION) AS NEEDED
Qty: 1 EACH | Refills: 2 | Status: SHIPPED | OUTPATIENT
Start: 2023-09-11

## 2023-09-14 NOTE — PROGRESS NOTES
Progress Note    Date: 09/20/2023  Time In: 1400  Time Out: 1459    Patient Legal Name: Thiago Kellogg  Patient Age: 47 y.o.    CHIEF COMPLAINT: Anxiety, Depression    Subjective   History of Present Illness     From previous progress note on 8/23/23:  Patient is to continue practicing positive body language, to appear approachable and to engage in social situations more frequently, as discussed in session. However, patient is to continue practicing self compassion and to give himself permission to relax and prioritize his own needs in the same settings. We will discuss further next session.    Assessment    Mental Status Exam     Appearance: good hygiene and dressed appropriately for the weather  Behavior: restless, fidgeting  Cooperation:  engaged, cooperative, attentive, and friendly  Eye Contact:  good  Affect:  congruent  Mood: anxious  Speech: responsive  Thought Process:  linear and organized  Thought Content: appropriate, abstract, and intrusive thoughts  Suicidal: denies  Homicidal:  denies  Hallucinations:  denies  Memory:  intact  Orientation:  person, place, time, and situation  Reliability:  reliable  Insight:  good  Judgment:  good     Clinical Intervention       ICD-10-CM ICD-9-CM   1. Generalized anxiety disorder  F41.1 300.02   2. Mild episode of recurrent major depressive disorder  F33.0 296.31        Thiago is a 47 y.o. male who presents today as a follow-up for continued psychotherapy. Individual psychotherapy was provided utilizing SFT & ACT techniques to promote problem-solving, encourage expression of thoughts and feelings, review treatment objectives, increase acceptance, discuss values and strengths, manage stress, recognize patterns of behavior, build confidence, challenge negative thinking patterns, and provide support.  Therapist offered support and utilized reflective listening as patient shared recent stressors including his decision to quit his job, and some thoughts that he has  "related to how his values may not align with other people's values. Patient states that he struggled to interact with some of his coworkers which likely was related to their values not aligning.  Therapist encouraged patient to identify his level of trust with various people, and patient acknowledged that he has difficulty trusting, which often results in a spike in anxiety (which he describes can get as severe as \"leading to a panic attack\").  Of note, patient does seem to be managing his anxiety significantly better than he had months ago, as he has consistently been working out at the gym, states that he \"feels a lot stronger and healthier,\" and has been compliant with his medication, which he states has been very effective at reducing his discomfort with symptoms of anxiety.    Plan   Plan & Goals     Patient was encouraged to identify and reflect on his values, to focus on what is most important in life. We we will continue to explore the ways in which patient is living in ego-syntonic ways versus finding himself in ego-dystonic situations, and reflect on how this may be negatively affecting his mood.     Patient acknowledged and verbally consented to continue working toward resolving current treatment plan goals and was educated on the importance of participation in the therapeutic process.  Patient will remain compliant with medication regimen as prescribed. Discuss any medication side effects, questions or concerns with prescribing provider.  Call 911 or present to the nearest emergency room in an emergency situation.  National Suicide Prevention Lifeline: Call 988. The Lifeline provides 24/7, free and confidential support for people in distress, prevention and crisis resources.  Crisis Text Line  Text HOME To 355726    Return in about 2 weeks (around 10/4/2023) for Next scheduled follow up.    ____________________  This document has been electronically signed by Dai Link LCSW  September 20, 2023 " 16:17 EDT    Part of this note may be an electronic transcription/translation of spoken language to printed text using the Dragon Dictation System.

## 2023-09-18 ENCOUNTER — TELEMEDICINE (OUTPATIENT)
Dept: PSYCHIATRY | Facility: CLINIC | Age: 48
End: 2023-09-18
Payer: MEDICAID

## 2023-09-18 DIAGNOSIS — F51.05 INSOMNIA DUE TO MENTAL DISORDER: ICD-10-CM

## 2023-09-18 DIAGNOSIS — F41.1 GENERALIZED ANXIETY DISORDER: ICD-10-CM

## 2023-09-18 DIAGNOSIS — F33.1 MAJOR DEPRESSIVE DISORDER, RECURRENT EPISODE, MODERATE: Primary | ICD-10-CM

## 2023-09-18 RX ORDER — BUSPIRONE HYDROCHLORIDE 7.5 MG/1
7.5 TABLET ORAL 3 TIMES DAILY
Qty: 90 TABLET | Refills: 2 | Status: SHIPPED | OUTPATIENT
Start: 2023-09-18 | End: 2023-12-17
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
## 2023-09-20 ENCOUNTER — OFFICE VISIT (OUTPATIENT)
Dept: PSYCHIATRY | Facility: CLINIC | Age: 48
End: 2023-09-20
Payer: MEDICAID

## 2023-09-20 DIAGNOSIS — F33.0 MILD EPISODE OF RECURRENT MAJOR DEPRESSIVE DISORDER: ICD-10-CM

## 2023-09-20 DIAGNOSIS — F41.1 GENERALIZED ANXIETY DISORDER: Primary | ICD-10-CM
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
## 2023-09-22 DIAGNOSIS — F51.05 INSOMNIA DUE TO MENTAL DISORDER: ICD-10-CM

## 2023-09-25 ENCOUNTER — LAB (OUTPATIENT)
Dept: LAB | Facility: HOSPITAL | Age: 48
End: 2023-09-25
Payer: MEDICAID

## 2023-09-25 DIAGNOSIS — E55.9 VITAMIN D INSUFFICIENCY: ICD-10-CM

## 2023-09-25 DIAGNOSIS — Z00.00 ANNUAL PHYSICAL EXAM: ICD-10-CM

## 2023-09-25 DIAGNOSIS — R73.01 IMPAIRED FASTING GLUCOSE: ICD-10-CM

## 2023-09-25 DIAGNOSIS — Z12.5 SCREENING FOR MALIGNANT NEOPLASM OF PROSTATE: ICD-10-CM

## 2023-09-25 LAB
25(OH)D3 SERPL-MCNC: 48.2 NG/ML (ref 30–100)
ALBUMIN SERPL-MCNC: 4.3 G/DL (ref 3.5–5.2)
ALBUMIN/GLOB SERPL: 1.6 G/DL
ALP SERPL-CCNC: 106 U/L (ref 39–117)
ALT SERPL W P-5'-P-CCNC: 26 U/L (ref 1–41)
ANION GAP SERPL CALCULATED.3IONS-SCNC: 11 MMOL/L (ref 5–15)
AST SERPL-CCNC: 24 U/L (ref 1–40)
BILIRUB SERPL-MCNC: 0.3 MG/DL (ref 0–1.2)
BUN SERPL-MCNC: 19 MG/DL (ref 6–20)
BUN/CREAT SERPL: 19.2 (ref 7–25)
CALCIUM SPEC-SCNC: 9.5 MG/DL (ref 8.6–10.5)
CHLORIDE SERPL-SCNC: 104 MMOL/L (ref 98–107)
CHOLEST SERPL-MCNC: 169 MG/DL (ref 0–200)
CO2 SERPL-SCNC: 25 MMOL/L (ref 22–29)
CREAT SERPL-MCNC: 0.99 MG/DL (ref 0.76–1.27)
EGFRCR SERPLBLD CKD-EPI 2021: 95.7 ML/MIN/1.73
GLOBULIN UR ELPH-MCNC: 2.7 GM/DL
GLUCOSE SERPL-MCNC: 92 MG/DL (ref 65–99)
HBA1C MFR BLD: 5.3 % (ref 4.8–5.6)
HDLC SERPL-MCNC: 41 MG/DL (ref 40–60)
LDLC SERPL CALC-MCNC: 111 MG/DL (ref 0–100)
LDLC/HDLC SERPL: 2.68 {RATIO}
POTASSIUM SERPL-SCNC: 4.4 MMOL/L (ref 3.5–5.2)
PROT SERPL-MCNC: 7 G/DL (ref 6–8.5)
PSA SERPL-MCNC: 0.34 NG/ML (ref 0–4)
SODIUM SERPL-SCNC: 140 MMOL/L (ref 136–145)
TRIGL SERPL-MCNC: 91 MG/DL (ref 0–150)
TSH SERPL DL<=0.05 MIU/L-ACNC: 0.13 UIU/ML (ref 0.27–4.2)
VLDLC SERPL-MCNC: 17 MG/DL (ref 5–40)

## 2023-09-25 PROCEDURE — 80050 GENERAL HEALTH PANEL: CPT

## 2023-09-25 PROCEDURE — 80061 LIPID PANEL: CPT

## 2023-09-25 PROCEDURE — 83036 HEMOGLOBIN GLYCOSYLATED A1C: CPT

## 2023-09-25 PROCEDURE — 84439 ASSAY OF FREE THYROXINE: CPT

## 2023-09-25 PROCEDURE — 84403 ASSAY OF TOTAL TESTOSTERONE: CPT

## 2023-09-25 PROCEDURE — 36415 COLL VENOUS BLD VENIPUNCTURE: CPT

## 2023-09-25 PROCEDURE — G0103 PSA SCREENING: HCPCS

## 2023-09-25 PROCEDURE — 84402 ASSAY OF FREE TESTOSTERONE: CPT

## 2023-09-25 PROCEDURE — 82306 VITAMIN D 25 HYDROXY: CPT

## 2023-09-25 RX ORDER — TRAZODONE HYDROCHLORIDE 50 MG/1
TABLET ORAL
Qty: 30 TABLET | Refills: 2 | Status: SHIPPED | OUTPATIENT
Start: 2023-09-25

## 2023-09-26 DIAGNOSIS — Z00.00 ANNUAL PHYSICAL EXAM: Primary | ICD-10-CM

## 2023-09-26 LAB
BASOPHILS # BLD AUTO: 0.04 10*3/MM3 (ref 0–0.2)
BASOPHILS NFR BLD AUTO: 0.8 % (ref 0–1.5)
DEPRECATED RDW RBC AUTO: 40.7 FL (ref 37–54)
EOSINOPHIL # BLD AUTO: 0.16 10*3/MM3 (ref 0–0.4)
EOSINOPHIL NFR BLD AUTO: 3 % (ref 0.3–6.2)
ERYTHROCYTE [DISTWIDTH] IN BLOOD BY AUTOMATED COUNT: 12.9 % (ref 12.3–15.4)
HCT VFR BLD AUTO: 43.2 % (ref 37.5–51)
HGB BLD-MCNC: 14.9 G/DL (ref 13–17.7)
IMM GRANULOCYTES # BLD AUTO: 0 10*3/MM3 (ref 0–0.05)
IMM GRANULOCYTES NFR BLD AUTO: 0 % (ref 0–0.5)
LYMPHOCYTES # BLD AUTO: 1.64 10*3/MM3 (ref 0.7–3.1)
LYMPHOCYTES NFR BLD AUTO: 31.1 % (ref 19.6–45.3)
MCH RBC QN AUTO: 30.3 PG (ref 26.6–33)
MCHC RBC AUTO-ENTMCNC: 34.5 G/DL (ref 31.5–35.7)
MCV RBC AUTO: 87.8 FL (ref 79–97)
MONOCYTES # BLD AUTO: 0.52 10*3/MM3 (ref 0.1–0.9)
MONOCYTES NFR BLD AUTO: 9.8 % (ref 5–12)
NEUTROPHILS NFR BLD AUTO: 2.92 10*3/MM3 (ref 1.7–7)
NEUTROPHILS NFR BLD AUTO: 55.3 % (ref 42.7–76)
NRBC BLD AUTO-RTO: 0 /100 WBC (ref 0–0.2)
PLATELET # BLD AUTO: 322 10*3/MM3 (ref 140–450)
PMV BLD AUTO: 10.4 FL (ref 6–12)
RBC # BLD AUTO: 4.92 10*6/MM3 (ref 4.14–5.8)
T4 FREE SERPL-MCNC: 1.84 NG/DL (ref 0.93–1.7)
WBC NRBC COR # BLD: 5.28 10*3/MM3 (ref 3.4–10.8)

## 2023-09-27 DIAGNOSIS — R79.89 ABNORMAL THYROID BLOOD TEST: Primary | ICD-10-CM

## 2023-09-28 ENCOUNTER — TELEPHONE (OUTPATIENT)
Dept: INTERNAL MEDICINE | Facility: CLINIC | Age: 48
End: 2023-09-28
Payer: MEDICAID

## 2023-09-28 NOTE — TELEPHONE ENCOUNTER
Caller: Thiago Kellogg    Relationship: Self    Best call back number: [phone]    What was the call regarding: PATIENT IS RETURNING A CALL TO [facility]

## 2023-09-29 NOTE — PROGRESS NOTES
Progress Note    Date: 10/04/2023  Time In: 1402  Time Out: 2188    Patient Legal Name: Thiago Kellogg  Patient Age: 47 y.o.    CHIEF COMPLAINT: Anxiety, Depression    Subjective   History of Present Illness     From previous progress note on 9/20/23:  Patient was encouraged to identify and reflect on his values, to focus on what is most important in life. We we will continue to explore the ways in which patient is living in ego-syntonic ways versus finding himself in ego-dystonic situations, and reflect on how this may be negatively affecting his mood.     Assessment    Mental Status Exam     Appearance: good hygiene and dressed appropriately for the weather  Behavior: calm  Cooperation:  engaged, cooperative, attentive, and friendly  Eye Contact:  good  Affect:  congruent  Mood: anxious  Speech: responsive  Thought Process:  linear, organized, and goal-oriented  Thought Content: appropriate and abstract  Suicidal: denies  Homicidal:  denies  Hallucinations:  denies  Memory:  intact  Orientation:  person, place, time, and situation  Reliability:  reliable  Insight:  good  Judgment:  good     Clinical Intervention       ICD-10-CM ICD-9-CM   1. Generalized anxiety disorder  F41.1 300.02   2. Mild episode of recurrent major depressive disorder  F33.0 296.31        Thiago is a 47 y.o. male who presents today as a follow-up for continued psychotherapy. Individual psychotherapy was provided utilizing ACT techniques to encourage expression of thoughts and feelings, challenge avoidance, increase acceptance, discuss values and strengths, manage stress, challenge negative thinking patterns, and provide support.  Therapist offered support and encouragement to patient as he discussed recent stressors.  Patient indicates that he has continued to have a consistent schedule to workout at the gym, has been mindful of his caffeine use and has been considering his values, which he discussed in more detail the session.  Therapist  encouraged patient to identify any areas of his life that he may be making choices that are ego-syntonic versus ego-dystonic, and patient had no difficulty with this task.  Patient did reflect on some of his past behavioral choices, and subsequently identified why some of his values are so important to him now.  Patient reflected on his level of anxiety, and acknowledged that he is managing his symptoms much more effectively than he had in the past, likely attributable to his medication regimen as well as some cognitive reframing that may be helpful.    Plan   Plan & Goals     Patient is to continue considering his values in his decision-making processes, as discussed in session.  We will explore how behavioral changes and cognitive processes may continue to improve his symptoms of anxiety and depression further next session.    Patient acknowledged and verbally consented to continue working toward resolving current treatment plan goals and was educated on the importance of participation in the therapeutic process.  Patient will remain compliant with medication regimen as prescribed. Discuss any medication side effects, questions or concerns with prescribing provider.  Call 911 or present to the nearest emergency room in an emergency situation.  National Suicide Prevention Lifeline: Call 988. The Lifeline provides 24/7, free and confidential support for people in distress, prevention and crisis resources.  Crisis Text Line  Text HOME To 456508    Return in about 2 weeks (around 10/18/2023) for Next scheduled follow up.    ____________________  This document has been electronically signed by Dai Link LCSW  October 4, 2023 16:28 EDT    Part of this note may be an electronic transcription/translation of spoken language to printed text using the Dragon Dictation System.

## 2023-10-04 ENCOUNTER — OFFICE VISIT (OUTPATIENT)
Dept: PSYCHIATRY | Facility: CLINIC | Age: 48
End: 2023-10-04
Payer: MEDICAID

## 2023-10-04 DIAGNOSIS — F41.1 GENERALIZED ANXIETY DISORDER: Primary | ICD-10-CM

## 2023-10-04 DIAGNOSIS — F33.0 MILD EPISODE OF RECURRENT MAJOR DEPRESSIVE DISORDER: ICD-10-CM
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
## 2023-10-05 LAB
TESTOST FREE SERPL-MCNC: 5.1 PG/ML (ref 6.8–21.5)
TESTOST SERPL-MCNC: 290 NG/DL (ref 264–916)

## 2023-10-05 NOTE — PROGRESS NOTES
Progress Note    Date: 10/18/2023  Time In: 1400  Time Out: 1457    Patient Legal Name: Thiago Kellogg  Patient Age: 47 y.o.    CHIEF COMPLAINT: Anxiety, Depression    Subjective   History of Present Illness     From previous progress note on 10/4/23:  Patient is to continue considering his values in his decision-making processes, as discussed in session.  We will explore how behavioral changes and cognitive processes may continue to improve his symptoms of anxiety and depression further next session.    Assessment    Mental Status Exam     Appearance: good hygiene and dressed appropriately for the weather  Behavior: calm  Cooperation:  engaged, cooperative, attentive, and friendly  Eye Contact:  good  Affect:  congruent  Mood: anxious  Speech: responsive  Thought Process:  linear and organized  Thought Content: appropriate and abstract  Suicidal: denies  Homicidal:  denies  Hallucinations:  denies  Memory:  intact  Orientation:  person, place, time, and situation  Reliability:  reliable  Insight:  good  Judgment:  good     Clinical Intervention       ICD-10-CM ICD-9-CM   1. Generalized anxiety disorder  F41.1 300.02   2. Mild episode of recurrent major depressive disorder  F33.0 296.31        Thiago is a 47 y.o. male who presents today as a follow-up for continued psychotherapy. Individual psychotherapy was provided utilizing ACT & ERP techniques to encourage expression of thoughts and feelings, establish new coping skills, challenge avoidance, manage stress, recognize patterns of behavior, acknowledge sources of feelings and behaviors, challenge negative thinking patterns, and provide support.  Therapist offered support and utilized reflective listening as patient shared his recent thoughts and stressors.  Patient reflected on his decision to leave his previous job, and anxiety related to working, social situations, and flying.  He states that he is considering visiting family and friends in [place] next  week, and that he would rather drive them fly for various reasons.  Patient had good insight into his patterns of behavior, and seems to process his stressors appropriately.      Plan   Plan & Goals     Therapist suggested that patient challenge avoidance as often as possible, while also keeping in mind a hierarchy of levels of anxiety.  Patient may benefit from establishing an exposure hierarchy if we move forward with a more structured ERP treatment modality, and we can discuss this further next session.    Patient acknowledged and verbally consented to continue working toward resolving current treatment plan goals and was educated on the importance of participation in the therapeutic process.  Patient will remain compliant with medication regimen as prescribed. Discuss any medication side effects, questions or concerns with prescribing provider.  Call 911 or present to the nearest emergency room in an emergency situation.  National Suicide Prevention Lifeline: Call 988. The Lifeline provides 24/7, free and confidential support for people in distress, prevention and crisis resources.  Crisis Text Line  Text HOME To 386836    Return in about 3 weeks (around 11/8/2023) for Next scheduled follow up.    ____________________  This document has been electronically signed by Dai Link LCSW  October 18, 2023 16:25 EDT    Part of this note may be an electronic transcription/translation of spoken language to printed text using the Dragon Dictation System.

## 2023-10-09 ENCOUNTER — TELEPHONE (OUTPATIENT)
Dept: INTERNAL MEDICINE | Facility: CLINIC | Age: 48
End: 2023-10-09
Payer: MEDICAID

## 2023-10-09 DIAGNOSIS — R79.89 LOW TESTOSTERONE: Primary | ICD-10-CM

## 2023-10-13 ENCOUNTER — HOSPITAL ENCOUNTER (OUTPATIENT)
Dept: ULTRASOUND IMAGING | Facility: HOSPITAL | Age: 48
Discharge: HOME | End: 2023-10-13
Admitting: NURSE PRACTITIONER
Payer: MEDICAID

## 2023-10-13 DIAGNOSIS — R79.89 ABNORMAL THYROID BLOOD TEST: ICD-10-CM

## 2023-10-13 PROCEDURE — 76536 US EXAM OF HEAD AND NECK: CPT

## 2023-10-17 ENCOUNTER — TELEMEDICINE (OUTPATIENT)
Dept: PSYCHIATRY | Facility: CLINIC | Age: 48
End: 2023-10-17
Payer: MEDICAID

## 2023-10-17 DIAGNOSIS — F41.1 GENERALIZED ANXIETY DISORDER: ICD-10-CM

## 2023-10-17 DIAGNOSIS — F51.05 INSOMNIA DUE TO MENTAL DISORDER: ICD-10-CM

## 2023-10-17 DIAGNOSIS — F33.1 MAJOR DEPRESSIVE DISORDER, RECURRENT EPISODE, MODERATE: Primary | ICD-10-CM

## 2023-10-17 RX ORDER — VORTIOXETINE 20 MG/1
1 TABLET, FILM COATED ORAL
Qty: 30 TABLET | Refills: 2 | Status: SHIPPED | OUTPATIENT
Start: 2023-10-17
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
## 2023-10-18 ENCOUNTER — OFFICE VISIT (OUTPATIENT)
Dept: PSYCHIATRY | Facility: CLINIC | Age: 48
End: 2023-10-18
Payer: MEDICAID

## 2023-10-18 DIAGNOSIS — F33.0 MILD EPISODE OF RECURRENT MAJOR DEPRESSIVE DISORDER: ICD-10-CM

## 2023-10-18 DIAGNOSIS — F41.1 GENERALIZED ANXIETY DISORDER: Primary | ICD-10-CM
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
## 2023-11-02 NOTE — PROGRESS NOTES
Progress Note    Date: 11/10/2023  Time In: 1055  Time Out: 1153    Patient Legal Name: Thiago Kellogg  Patient Age: 48 y.o.    CHIEF COMPLAINT: Depression, Anxiety    Subjective   History of Present Illness     From previous progress note on 10/18/23:  Therapist suggested that patient challenge avoidance as often as possible, while also keeping in mind a hierarchy of levels of anxiety.  Patient may benefit from establishing an exposure hierarchy if we move forward with a more structured ERP treatment modality, and we can discuss this further next session.    Assessment    Mental Status Exam     Appearance: good hygiene and dressed appropriately for the weather  Behavior: calm  Cooperation:  engaged, cooperative, attentive, and friendly  Eye Contact:  good  Affect:  congruent  Mood: depressed  Speech: responsive  Thought Process:  linear, organized, and goal-oriented  Thought Content: appropriate and abstract  Suicidal: denies  Homicidal:  denies  Hallucinations:  denies  Memory:  intact  Orientation:  person, place, time, and situation  Reliability:  reliable  Insight:  good  Judgment:  good     Clinical Intervention       ICD-10-CM ICD-9-CM   1. Major depressive disorder, recurrent episode, moderate  F33.1 296.32   2. Generalized anxiety disorder  F41.1 300.02        Thiago is a 48 y.o. male who presents today as a follow-up for continued psychotherapy. Individual psychotherapy was provided utilizing CBT techniques to provide symptom relief, establish new coping skills, manage stress, recognize patterns of behavior, acknowledge sources of feelings and behaviors, challenge negative thinking patterns, and provide support.  Patient reports that since last session, he has stopped drinking caffeine, and has in turn felt less anxious.  However, he states that caffeine use to motivate him and give him the energy to do things, which he is now finding to be more difficult.  He has not been exercising as regularly, and  has felt apathetic over the last week.  Therapist provided psychoeducation on the importance of behavioral activation (setting small goals and taking steps to complete those goals) in order to improve mood and decrease depressive symptoms.  We discussed the basic CBT model and patient acknowledged how his thoughts, feelings and behaviors are all connected.      Plan   Plan & Goals     Therapist encouraged patient to reflect on the CBT model, as discussed in session, in order to focus on behavioral change and to improve overall mood.  Patient tends to analyze, and his thoughts are likely more difficult to alter, and behavioral adjustments may be more beneficial to him at this time.  Patient did set a goal for himself at the end of session, and therapist will continue to provide support to patient through this process.  We will discuss further next session.    Patient acknowledged and verbally consented to continue working toward resolving current treatment plan goals and was educated on the importance of participation in the therapeutic process.  Patient will remain compliant with medication regimen as prescribed. Discuss any medication side effects, questions or concerns with prescribing provider.  Call 911 or present to the nearest emergency room in an emergency situation.  National Suicide Prevention Lifeline: Call 988. The Lifeline provides 24/7, free and confidential support for people in distress, prevention and crisis resources.  Crisis Text Line  Text HOME To 319617    Return in about 2 weeks (around 11/24/2023) for Next scheduled follow up.    ____________________  This document has been electronically signed by Dai Link LCSW  November 10, 2023 12:05 EST    Part of this note may be an electronic transcription/translation of spoken language to printed text using the Dragon Dictation System.

## 2023-11-10 ENCOUNTER — OFFICE VISIT (OUTPATIENT)
Dept: PSYCHIATRY | Facility: CLINIC | Age: 48
End: 2023-11-10
Payer: MEDICAID

## 2023-11-10 DIAGNOSIS — F41.1 GENERALIZED ANXIETY DISORDER: ICD-10-CM

## 2023-11-10 DIAGNOSIS — F33.1 MAJOR DEPRESSIVE DISORDER, RECURRENT EPISODE, MODERATE: Primary | ICD-10-CM

## 2023-12-12 ENCOUNTER — TELEMEDICINE (OUTPATIENT)
Dept: PSYCHIATRY | Facility: CLINIC | Age: 48
End: 2023-12-12
Payer: MEDICAID

## 2023-12-12 DIAGNOSIS — F33.1 MAJOR DEPRESSIVE DISORDER, RECURRENT EPISODE, MODERATE: Primary | ICD-10-CM

## 2023-12-12 DIAGNOSIS — F51.05 INSOMNIA DUE TO MENTAL DISORDER: ICD-10-CM

## 2023-12-12 DIAGNOSIS — F41.1 GENERALIZED ANXIETY DISORDER: ICD-10-CM

## 2023-12-12 PROCEDURE — 1160F RVW MEDS BY RX/DR IN RCRD: CPT | Performed by: NURSE PRACTITIONER

## 2023-12-12 PROCEDURE — 99214 OFFICE O/P EST MOD 30 MIN: CPT | Performed by: NURSE PRACTITIONER

## 2023-12-12 PROCEDURE — 1159F MED LIST DOCD IN RCRD: CPT | Performed by: NURSE PRACTITIONER

## 2023-12-12 RX ORDER — BUSPIRONE HYDROCHLORIDE 7.5 MG/1
7.5 TABLET ORAL 3 TIMES DAILY
Qty: 90 TABLET | Refills: 2 | Status: SHIPPED | OUTPATIENT
Start: 2023-12-12 | End: 2024-03-11

## 2023-12-12 RX ORDER — VORTIOXETINE 20 MG/1
1 TABLET, FILM COATED ORAL
Qty: 30 TABLET | Refills: 2 | Status: SHIPPED | OUTPATIENT
Start: 2024-01-10

## 2023-12-12 NOTE — PROGRESS NOTES
"This provider is located at the [facility] (through [facility]), [address], [facility], [address] using a secure [employer] Video Visit through [employer]. Patient is being seen remotely via telehealth at their home address in [place], and stated they are in a secure environment for this session. The patient's condition being diagnosed/treated is appropriate for telemedicine. The provider identified himself as well as his credentials.   The patient consent to be seen remotely, and when consent is given they understand that the consent allows for patient identifiable information to be sent to a third party as needed.   They may refuse to be seen remotely at any time. The electronic data is encrypted and password protected, and the patient  has been advised of the potential risks to privacy not withstanding such measures.    You have chosen to receive care through a telehealth visit.  Do you consent to use a video/audio connection for your medical care today? Yes    Patient identifiers utilized: Name and date of birth.    Patient verbally confirmed consent for today's encounter- yes    Subjective   Thiago Kellogg is a 48 y.o. male who presents today for follow-up appointment.     Chief Complaint:  Depression, anxiety    History of Present Illness:     Mood has been \"pretty good\". Went on vacation to [place]- two weeks. \"It was cool\". Ready to get home. Taking train back home. Sleeping \"pretty good\". Trazodone taken as needed. Energy \"normal\". Anxiety has been high at times. Flew a few times during vacation which increased anxiety and worries. \"Just held on til it was over\". Distraction helps. Medication helping.     Prior Psychiatric Medications:  Prozac, Celexa, Zoloft, Effexor, Klonopin     The following portions of the patient's history were reviewed and updated as appropriate: allergies, current medications, past family history, past medical history, past social " history, past surgical history and problem list.    Allergy:   Allergies   Allergen Reactions    Erythromycin Unknown - High Severity        Current Medications:   Current Outpatient Medications   Medication Sig Dispense Refill    busPIRone (BUSPAR) 7.5 MG tablet Take 1 tablet by mouth 3 (Three) Times a Day for 90 days. 90 tablet 2    [START ON 1/10/2024] Vortioxetine HBr (Trintellix) 20 MG tablet Take 1 tablet by mouth Daily With Breakfast. 30 tablet 2    Albuterol Sulfate, sensor, (ProAir Digihaler) 108 (90 Base) MCG/ACT aerosol powder  Inhale 2 puffs As Needed (wheezing). 1 each 2    Creatine powder Use.      emtricitabine-tenofovir (TRUVADA) 200-300 MG per tablet Take 1 tablet by mouth Daily. 2 TABLETS DAY OF, 1 TABLET 24 HRS LATER, 1 TABLET 24 HRS AFTER THAT FOR PREVENTION AS NEEDED      traZODone (DESYREL) 50 MG tablet TAKE 1 TABLET BY MOUTH EVERY NIGHT AS NEEDED 30 tablet 2     No current facility-administered medications for this visit.       Mental Status Exam:   Hygiene:   good  Cooperation:  Cooperative  Eye Contact:  Good  Psychomotor Behavior:  Appropriate  Affect:  Full range  Mood: normal  Hopelessness: Denies  Speech:  Normal  Thought Process:  Goal directed  Thought Content:  Normal  Suicidal:  None  Homicidal:  None  Hallucinations:  None  Delusion:  None  Memory:  Intact  Orientation:  Grossly intact  Reliability:  good  Insight:  Good  Judgement:  Good  Impulse Control:  Good  Physical/Medical Issues:  No      Physical Exam:   There were no vitals taken for this visit. There is no height or weight on file to calculate BMI.   Due to the remote nature of this encounter (virtual encounter), vitals were unable to be obtained.  Weight change: n    PHQ-9 Depression Screening  Little interest or pleasure in doing things? (P) 0-->not at all   Feeling down, depressed, or hopeless? (P) 1-->several days   Trouble falling or staying asleep, or sleeping too much? (P) 0-->not at all   Feeling tired or having  little energy? (P) 0-->not at all   Poor appetite or overeating? (P) 0-->not at all   Feeling bad about yourself - or that you are a failure or have let yourself or your family down? (P) 1-->several days   Trouble concentrating on things, such as reading the newspaper or watching television? (P) 0-->not at all   Moving or speaking so slowly that other people could have noticed? Or the opposite - being so fidgety or restless that you have been moving around a lot more than usual? (P) 0-->not at all   Thoughts that you would be better off dead, or of hurting yourself in some way? (P) 0-->not at all   PHQ-9 Total Score (P) 2   If you checked off any problems, how difficult have these problems made it for you to do your work, take care of things at home, or get along with other people? (P) somewhat difficult     PHQ-9 Total Score: (P) 2    Feeling nervous, anxious or on edge: (P) Several days  Not being able to stop or control worrying: (P) Not at all  Worrying too much about different things: (P) Several days  Trouble Relaxing: (P) Not at all  Being so restless that it is hard to sit still: (P) Not at all  Feeling afraid as if something awful might happen: (P) Not at all  Becoming easily annoyed or irritable: (P) Not at all  PRINCESS 7 Total Score: (P) 2  If you checked any problems, how difficult have these problems made it for you to do your work, take care of things at home, or get along with other people: (P) Somewhat difficult    Previous available Provider notes and records reviewed by this APRN at today's encounter.     Visit Diagnoses:    ICD-10-CM ICD-9-CM   1. Major depressive disorder, recurrent episode, moderate  F33.1 296.32   2. Generalized anxiety disorder  F41.1 300.02   3. Insomnia due to mental disorder  F51.05 300.9     327.02       TREATMENT PLAN: Continue supportive psychotherapy efforts and medications.  Medication and treatment options, both pharmacological and non-pharmacological treatment options,  discussed during today's visit, including any off label use of medication. Patient acknowledged and verbally consented with current treatment plan and was educated on the importance of compliance with treatment and follow-up appointments.      - Continue trintellix 20mg po qday to target depression and anxiety  - Continue trazodone 50mg po qhs prn insomnia  - Continue buspirone 7.5mg po tid anxiety    Labs: None ordered at this time  Therapy: Defers    MEDICATION ISSUES:  Discussed treatment plan and medication options of prescribed medication as well as the risks, benefits, any black box warnings, and side effects including potential falls, possible impaired driving, and metabolic adversities among others, including any off label use of medication. Patient is agreeable to call the office with any worsening of symptoms or onset of side effects, or if any concerns or questions arise.  The contact information for the office is made available to the patient. Patient is agreeable to call 911 or go to the nearest ER should they begin having any SI/HI, or if any urgent concerns arise. No medication side effects or related complaints today. ALLA reviewed as expected.    RISK ASSESSMENT:  Risk of self-harm acutely is moderate.  Risk factors include anxiety disorder, mood disorder, and recent psychosocial stressors (pandemic). Protective factors include no family history, denies access to guns/weapons, no present SI, no history of suicide attempts or self-harm in the past, minimal AODA, healthcare seeking, future orientation, willingness to engage in care.  Risk of self-harm chronically is also moderate, but could be further elevated in the event of treatment noncompliance and/or AODA.    VERBAL INFORMED CONSENT FOR MEDICATION:  The patient was educated that their proposed/prescribed psychotropic medication(s) has potential risks, side effects, adverse effects, and black box warnings; and these have been discussed with the  patient.  The patient has been informed that their treatment and medication dosage is to be individualized, and may even be above or below the recommended range/dosage due to patient individualization and response, but medication is prescribed using a shared decision making approach, and no medication or dosage will be prescribed without the patient's verbal consent.  The reason for the use of the medication including any off label use and alternative modes of treatment other than or in addition to medication has been considered and discussed, the probable consequences of not receiving the proposed treatment have been discussed, and any treatment side effects, black box warnings, and cautions associated with treatment have been discussed with the patient.  The patient is allowed ample time to openly discuss and ask questions regarding the proposed medication(s) and treatment plan and the patient verbalizes understanding the reasons for the use of the medication, its potential risks and benefits, other alternative treatment(s), and the probable consequences that may occur if the proposed medication is not given.  The patient has been given ample time to ask questions and study the information and find the information to be specific, accurate, and complete.  The patient gives verbal consent for the medication(s) proposed/prescribed, they verbalized understanding that they can refuse and withdraw consent at any time with the assistance of this APRN, and the patient has verbally confirmed that they are aware, and are willing, to take the prescribed medication and follow the treatment plan with the known possible risks, side effect, black box warnings, and any potential medication interactions, and the patient reports they will be worse off without this medication and treatment plan.  The patient is advised to contact this APRN/this office if any questions or concerns arise at any time (at [phone]), or call 911/go  to the closest emergency department if needed or outside of office hours.      [facility] No Show Policy:  We understand unexpected circumstances arise; however, anytime you miss your appointment we are unable to provide you appropriate care.  In addition, each appointment missed could have been used to provide care for others.  We ask that you call at least 24 hours in advance to cancel or reschedule an appointment.  We would like to take this opportunity to remind you of our policy stating patients who miss THREE or more appointments without cancelling or rescheduling 24 hours in advance of the appointment may be subject to cancellation of any further visits with our clinic and recommendation to seek in-person services/visits.    Please call [phone] to reschedule your appointment. If there are reasons that make it difficult for you to keep the appointments, please call and let us know how we can help.  Please understand that medication prescribing will not continue without seeing your provider.      [facility]'s No Show Policy reviewed with patient at today's visit. Patient verbalized understanding of this policy. Discussed with patient that in the event that there are three or more no show visits, it will be recommended that they pursue in-person services/visits as noncompliance with telehealth visits indicates that patient is not an appropriate candidate for telemedicine and would likely be more appropriate for in-person services/visits. Patient verbalizes understanding and is agreeable to this.    MEDS ORDERED DURING VISIT:  New Medications Ordered This Visit   Medications    busPIRone (BUSPAR) 7.5 MG tablet     Sig: Take 1 tablet by mouth 3 (Three) Times a Day for 90 days.     Dispense:  90 tablet     Refill:  2    Vortioxetine HBr (Trintellix) 20 MG tablet     Sig: Take 1 tablet by mouth Daily With Breakfast.     Dispense:  30 tablet     Refill:  2        Return in about 8 weeks (around 2/6/2024) for Next scheduled follow up.         Progress toward goal: At goal    Functional Status: No impairment    Prognosis: Good with Ongoing Treatment     This document has been electronically signed by ELIANA Bah  December 12, 2023 14:44 EST      Please note that portions of this note were completed with a voice recognition program.

## 2023-12-15 DIAGNOSIS — F51.05 INSOMNIA DUE TO MENTAL DISORDER: ICD-10-CM

## 2023-12-18 RX ORDER — TRAZODONE HYDROCHLORIDE 50 MG/1
TABLET ORAL
Qty: 30 TABLET | Refills: 2 | Status: SHIPPED | OUTPATIENT
Start: 2023-12-18

## 2024-01-16 NOTE — PROGRESS NOTES
Progress Note    Date: 01/26/2024  Time In: 0800  Time Out: 0857    Patient Legal Name: Thiago Kellogg  Patient Age: 48 y.o.    CHIEF COMPLAINT: Depression, Anxiety    Subjective   History of Present Illness     From previous progress note on 11/10/23:  Therapist encouraged patient to reflect on the CBT model, as discussed in session, in order to focus on behavioral change and to improve overall mood.  Patient tends to analyze, and his thoughts are likely more difficult to alter, and behavioral adjustments may be more beneficial to him at this time.  Patient did set a goal for himself at the end of session, and therapist will continue to provide support to patient through this process.  We will discuss further next session.    01/26/2024  PHQ-9 score: 6  PRINCESS-7 score: 4    Assessment    Mental Status Exam     Appearance: good hygiene and dressed appropriately for the weather  Behavior: calm  Cooperation:  engaged, cooperative, attentive, and friendly  Eye Contact:  good  Affect:  congruent  Mood: euthymic  Speech: responsive  Thought Process:  linear and organized  Thought Content: appropriate and abstract  Suicidal: denies  Homicidal:  denies  Hallucinations:  denies  Memory:  intact  Orientation:  person, place, time, and situation  Reliability:  reliable  Insight:  good  Judgment:  good     Clinical Intervention       ICD-10-CM ICD-9-CM   1. Mild episode of recurrent major depressive disorder  F33.0 296.31   2. Generalized anxiety disorder  F41.1 300.02        Thiago is a 48 y.o. male who presents today as a follow-up for continued psychotherapy. Individual psychotherapy was provided utilizing CBT techniques to encourage expression of thoughts and feelings, recognize patterns of behavior, identify strengths, assess symptoms, provide support, and define and establish appropriate boundaries.  Patient reports that since last session, he went on a trip to [place], [place], [place], and back to [place].  He states  that this vacation was beneficial to him, as he likes adventure and exploring and he notices a significant decrease in anxiety when he is traveling.  Patient addressed his relationship with his father throughout childhood and into adulthood, and reflected on some distress about reconnecting with him.     Plan   Plan & Goals     Therapist praised patient for making efforts to prioritize his needs, while also being open to spending time with his father, even though the outcome was not as expected.  Patient appears to be managing his mood well, and states that he is motivated to attend regular sessions for a while, as we had a gap in service previously. We will continue to address adaptive coping, goal-setting using a strengths-based approach in future sessions.    Patient acknowledged and verbally consented to continue working toward resolving current treatment plan goals and was educated on the importance of participation in the therapeutic process.  Patient will remain compliant with medication regimen as prescribed. Discuss any medication side effects, questions or concerns with prescribing provider.  Call 911 or present to the nearest emergency room in an emergency situation.  National Suicide Prevention Lifeline: Call 988. The Lifeline provides 24/7, free and confidential support for people in distress, prevention and crisis resources.  Crisis Text Line  Text HOME To 663232    Return in about 2 weeks (around 2/9/2024) for Next scheduled follow up.    ____________________  This document has been electronically signed by Dai Link LCSW  January 26, 2024 11:23 EST    Part of this note may be an electronic transcription/translation of spoken language to printed text using the Dragon Dictation System.

## 2024-01-26 ENCOUNTER — OFFICE VISIT (OUTPATIENT)
Dept: PSYCHIATRY | Facility: CLINIC | Age: 49
End: 2024-01-26
Payer: MEDICAID

## 2024-01-26 DIAGNOSIS — F33.0 MILD EPISODE OF RECURRENT MAJOR DEPRESSIVE DISORDER: Primary | ICD-10-CM

## 2024-01-26 DIAGNOSIS — F41.1 GENERALIZED ANXIETY DISORDER: ICD-10-CM
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
## 2024-02-01 NOTE — PROGRESS NOTES
Progress Note    Date: 02/07/2024  Time In: 1600  Time Out: 1655    Patient Legal Name: Thiago Kellogg  Patient Age: 48 y.o.    CHIEF COMPLAINT: Depression, Anxiety    Subjective   History of Present Illness     From previous progress note on 1/26/24:  Therapist praised patient for making efforts to prioritize his needs, while also being open to spending time with his father, even though the outcome was not as expected.  Patient appears to be managing his mood well, and states that he is motivated to attend regular sessions for a while, as we had a gap in service previously. We will continue to address adaptive coping, goal-setting using a strengths-based approach in future sessions.    Assessment    Mental Status Exam     Appearance: good hygiene and dressed appropriately for the weather  Behavior: calm  Cooperation:  engaged, cooperative, attentive, and friendly  Eye Contact:  good  Affect:  congruent  Mood: depressed and anxious  Speech: responsive  Thought Process:  linear and organized  Thought Content: appropriate, abstract, and intrusive thoughts  Suicidal: denies  Homicidal:  denies  Hallucinations:  denies  Memory:  intact  Orientation:  person, place, time, and situation  Reliability:  reliable  Insight:  good  Judgment:  good     Clinical Intervention       ICD-10-CM ICD-9-CM   1. Major depressive disorder, recurrent episode, moderate  F33.1 296.32   2. Generalized anxiety disorder  F41.1 300.02        Thiago is a 48 y.o. male who presents today as a follow-up for continued psychotherapy. Individual psychotherapy was provided utilizing ACT & SFT techniques to encourage expression of thoughts and feelings, increase acceptance, manage stress, recognize patterns of behavior, acknowledge sources of feelings and behaviors, challenge negative thinking patterns, and provide support.  Patient reports that he has been consistently going to the gym and working out has been helping stabilize his mood, although he  "is feeling more depressed lately and \"tired and lethargic.\"  Patient reports that he is making efforts to decrease his sugar intake, and his goal is to lose weight.  He also reflected on experiences in childhood that have negatively impacted his self-esteem.      Plan   Plan & Goals     Therapist offered cognitive reframing and challenged patient to consider self-compassion and positive self talk to improve his overall mood.  Therapist also suggested the patient speak with a dietitian or a nutritionist to discuss healthy eating habits, while also remaining consistent with exercise.  Patient states that he will consider looking into volunteer activities as well.  We will continue to discuss effective coping strategies further next session.    Patient acknowledged and verbally consented to continue working toward resolving current treatment plan goals and was educated on the importance of participation in the therapeutic process.  Patient will remain compliant with medication regimen as prescribed. Discuss any medication side effects, questions or concerns with prescribing provider.  Call 911 or present to the nearest emergency room in an emergency situation.  National Suicide Prevention Lifeline: Call 988. The Lifeline provides 24/7, free and confidential support for people in distress, prevention and crisis resources.  Crisis Text Line  Text HOME To 610191    Return in about 2 weeks (around 2/21/2024) for Next scheduled follow up.    ____________________  This document has been electronically signed by Dai Link LCSW  February 7, 2024 17:04 EST    Part of this note may be an electronic transcription/translation of spoken language to printed text using the Dragon Dictation System.  "

## 2024-02-07 ENCOUNTER — OFFICE VISIT (OUTPATIENT)
Dept: PSYCHIATRY | Facility: CLINIC | Age: 49
End: 2024-02-07
Payer: MEDICAID

## 2024-02-07 DIAGNOSIS — F33.1 MAJOR DEPRESSIVE DISORDER, RECURRENT EPISODE, MODERATE: Primary | ICD-10-CM

## 2024-02-07 DIAGNOSIS — F41.1 GENERALIZED ANXIETY DISORDER: ICD-10-CM
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
## 2024-02-13 ENCOUNTER — TELEMEDICINE (OUTPATIENT)
Dept: PSYCHIATRY | Facility: CLINIC | Age: 49
End: 2024-02-13
Payer: MEDICAID

## 2024-02-13 DIAGNOSIS — F41.1 GENERALIZED ANXIETY DISORDER: ICD-10-CM

## 2024-02-13 DIAGNOSIS — F33.1 MAJOR DEPRESSIVE DISORDER, RECURRENT EPISODE, MODERATE: Primary | ICD-10-CM

## 2024-02-13 PROCEDURE — 99214 OFFICE O/P EST MOD 30 MIN: CPT | Performed by: NURSE PRACTITIONER

## 2024-02-13 PROCEDURE — 1160F RVW MEDS BY RX/DR IN RCRD: CPT | Performed by: NURSE PRACTITIONER

## 2024-02-13 PROCEDURE — 1159F MED LIST DOCD IN RCRD: CPT | Performed by: NURSE PRACTITIONER

## 2024-03-28 DIAGNOSIS — F41.1 GENERALIZED ANXIETY DISORDER: ICD-10-CM

## 2024-04-01 RX ORDER — BUSPIRONE HYDROCHLORIDE 7.5 MG/1
7.5 TABLET ORAL 3 TIMES DAILY
Qty: 90 TABLET | Refills: 2 | Status: SHIPPED | OUTPATIENT
Start: 2024-04-01

## 2024-04-21 DIAGNOSIS — F51.05 INSOMNIA DUE TO MENTAL DISORDER: ICD-10-CM

## 2024-04-22 RX ORDER — TRAZODONE HYDROCHLORIDE 50 MG/1
TABLET ORAL
Qty: 30 TABLET | Refills: 2 | Status: SHIPPED | OUTPATIENT
Start: 2024-04-22

## 2024-05-07 DIAGNOSIS — F33.1 MAJOR DEPRESSIVE DISORDER, RECURRENT EPISODE, MODERATE: ICD-10-CM

## 2024-05-07 RX ORDER — VORTIOXETINE 20 MG/1
1 TABLET, FILM COATED ORAL
Qty: 30 TABLET | Refills: 2 | Status: SHIPPED | OUTPATIENT
Start: 2024-05-07

## 2024-05-14 ENCOUNTER — TELEMEDICINE (OUTPATIENT)
Dept: PSYCHIATRY | Facility: CLINIC | Age: 49
End: 2024-05-14
Payer: MEDICAID

## 2024-05-14 DIAGNOSIS — F51.05 INSOMNIA DUE TO MENTAL DISORDER: ICD-10-CM

## 2024-05-14 DIAGNOSIS — F41.1 GENERALIZED ANXIETY DISORDER: ICD-10-CM

## 2024-05-14 DIAGNOSIS — F33.1 MAJOR DEPRESSIVE DISORDER, RECURRENT EPISODE, MODERATE: Primary | ICD-10-CM

## 2024-05-14 PROCEDURE — 1160F RVW MEDS BY RX/DR IN RCRD: CPT | Performed by: NURSE PRACTITIONER

## 2024-05-14 PROCEDURE — 99214 OFFICE O/P EST MOD 30 MIN: CPT | Performed by: NURSE PRACTITIONER

## 2024-05-14 PROCEDURE — 1159F MED LIST DOCD IN RCRD: CPT | Performed by: NURSE PRACTITIONER

## 2024-05-14 RX ORDER — BUSPIRONE HYDROCHLORIDE 10 MG/1
10 TABLET ORAL 3 TIMES DAILY
Qty: 90 TABLET | Refills: 2 | Status: SHIPPED | OUTPATIENT
Start: 2024-05-14

## 2024-05-14 NOTE — PROGRESS NOTES
"This provider is located at the [facility]), [address], [place], [address] using a secure Tic[employer]ac[employer]ihart Video Visit through [employer]. Patient is being seen remotely via telehealth at their home address in [place], and stated they are in a secure environment for this session. The patient's condition being diagnosed/treated is appropriate for telemedicine. The provider identified himself as well as his credentials.   The patient consent to be seen remotely, and when consent is given they understand that the consent allows for patient identifiable information to be sent to a third party as needed.   They may refuse to be seen remotely at any time. The electronic data is encrypted and password protected, and the patient  has been advised of the potential risks to privacy not withstanding such measures.    You have chosen to receive care through a telehealth visit.  Do you consent to use a video/audio connection for your medical care today? Yes    Patient identifiers utilized: Name and date of birth.    Patient verbally confirmed consent for today's encounter- yes    Subjective   Thiago Kellogg is a 48 y.o. male who presents today for follow-up appointment.     Chief Complaint:  Depression, anxiety    History of Present Illness:     Patient reports mood has improved over the past few months, stating, \"things are going pretty good.\" Feeling less down. Sleeping well. Energy levels good. Anxiety has improved, stating, \"less worrying about a situation before going in.\" Endorses social anxiety at times. Feels as though his medication is helping.     Prior Psychiatric Medications:  Prozac, Celexa, Zoloft, Effexor, Klonopin      The following portions of the patient's history were reviewed and updated as appropriate: allergies, current medications, past family history, past medical history, past social history, past surgical history and problem list.    Allergy: "   Allergies   Allergen Reactions    Erythromycin Unknown - High Severity        Current Medications:   Current Outpatient Medications   Medication Sig Dispense Refill    busPIRone (BUSPAR) 10 MG tablet Take 1 tablet by mouth 3 (Three) Times a Day. 90 tablet 2    Albuterol Sulfate, sensor, (ProAir Digihaler) 108 (90 Base) MCG/ACT aerosol powder  Inhale 2 puffs As Needed (wheezing). 1 each 2    Creatine powder Use.      emtricitabine-tenofovir (TRUVADA) 200-300 MG per tablet Take 1 tablet by mouth Daily. 2 TABLETS DAY OF, 1 TABLET 24 HRS LATER, 1 TABLET 24 HRS AFTER THAT FOR PREVENTION AS NEEDED      traZODone (DESYREL) 50 MG tablet TAKE 1 TABLET BY MOUTH EVERY NIGHT AS NEEDED 30 tablet 2    Trintellix 20 MG tablet TAKE 1 TABLET BY MOUTH DAILY WITH BREAKFAST 30 tablet 2     No current facility-administered medications for this visit.       Mental Status Exam:   Hygiene:   good  Cooperation:  Cooperative  Eye Contact:  Good  Psychomotor Behavior:  Appropriate  Affect:  Full range  Mood: normal  Hopelessness: Denies  Speech:  Normal  Thought Process:  Goal directed  Thought Content:  Normal  Suicidal:  None  Homicidal:  None  Hallucinations:  None  Delusion:  None  Memory:  Intact  Orientation:  Grossly intact  Reliability:  good  Insight:  Good  Judgement:  Good  Impulse Control:  Good  Physical/Medical Issues:  No      Physical Exam:   There were no vitals taken for this visit. There is no height or weight on file to calculate BMI.   Due to the remote nature of this encounter (virtual encounter), vitals were unable to be obtained.  Weight change: n    PHQ-9 Depression Screening  Little interest or pleasure in doing things? (P) 0-->not at all   Feeling down, depressed, or hopeless? (P) 1-->several days   Trouble falling or staying asleep, or sleeping too much? (P) 0-->not at all   Feeling tired or having little energy? (P) 0-->not at all   Poor appetite or overeating? (P) 0-->not at all   Feeling bad about yourself -  or that you are a failure or have let yourself or your family down? (P) 1-->several days   Trouble concentrating on things, such as reading the newspaper or watching television? (P) 0-->not at all   Moving or speaking so slowly that other people could have noticed? Or the opposite - being so fidgety or restless that you have been moving around a lot more than usual? (P) 0-->not at all   Thoughts that you would be better off dead, or of hurting yourself in some way? (P) 0-->not at all   PHQ-9 Total Score (P) 2   If you checked off any problems, how difficult have these problems made it for you to do your work, take care of things at home, or get along with other people? (P) somewhat difficult     PHQ-9 Total Score: (P) 2    Feeling nervous, anxious or on edge: (P) Not at all  Not being able to stop or control worrying: (P) Not at all  Worrying too much about different things: (P) Several days  Trouble Relaxing: (P) Not at all  Being so restless that it is hard to sit still: (P) Not at all  Feeling afraid as if something awful might happen: (P) Several days  Becoming easily annoyed or irritable: (P) Several days  PRINCESS 7 Total Score: (P) 3  If you checked any problems, how difficult have these problems made it for you to do your work, take care of things at home, or get along with other people: (P) Somewhat difficult    Previous available Provider notes and records reviewed by this APRN at today's encounter.     Visit Diagnoses:    ICD-10-CM ICD-9-CM   1. Major depressive disorder, recurrent episode, moderate  F33.1 296.32   2. Generalized anxiety disorder  F41.1 300.02   3. Insomnia due to mental disorder  F51.05 300.9     327.02       TREATMENT PLAN: Continue supportive psychotherapy efforts and medications.  Medication and treatment options, both pharmacological and non-pharmacological treatment options, discussed during today's visit, including any off label use of medication. Patient acknowledged and verbally  consented with current treatment plan and was educated on the importance of compliance with treatment and follow-up appointments.      - Continue trintellix 20mg po qday to target depression and anxiety  - Continue trazodone 50mg po qhs prn insomnia  - Continue buspirone 7.5mg po tid anxiety    Labs: None ordered at this time  Therapy: Defers    MEDICATION ISSUES:  Discussed treatment plan and medication options of prescribed medication as well as the risks, benefits, any black box warnings, and side effects including potential falls, possible impaired driving, and metabolic adversities among others, including any off label use of medication. Patient is agreeable to call the office with any worsening of symptoms or onset of side effects, or if any concerns or questions arise.  The contact information for the office is made available to the patient. Patient is agreeable to call 911 or go to the nearest ER should they begin having any SI/HI, or if any urgent concerns arise. No medication side effects or related complaints today. ALLA reviewed as expected.    RISK ASSESSMENT:  Risk of self-harm acutely is moderate.  Risk factors include anxiety disorder, mood disorder, and recent psychosocial stressors (pandemic). Protective factors include no family history, denies access to guns/weapons, no present SI, no history of suicide attempts or self-harm in the past, minimal AODA, healthcare seeking, future orientation, willingness to engage in care.  Risk of self-harm chronically is also moderate, but could be further elevated in the event of treatment noncompliance and/or AODA.    VERBAL INFORMED CONSENT FOR MEDICATION:  The patient was educated that their proposed/prescribed psychotropic medication(s) has potential risks, side effects, adverse effects, and black box warnings; and these have been discussed with the patient.  The patient has been informed that their treatment and medication dosage is to be individualized,  and may even be above or below the recommended range/dosage due to patient individualization and response, but medication is prescribed using a shared decision making approach, and no medication or dosage will be prescribed without the patient's verbal consent.  The reason for the use of the medication including any off label use and alternative modes of treatment other than or in addition to medication has been considered and discussed, the probable consequences of not receiving the proposed treatment have been discussed, and any treatment side effects, black box warnings, and cautions associated with treatment have been discussed with the patient.  The patient is allowed ample time to openly discuss and ask questions regarding the proposed medication(s) and treatment plan and the patient verbalizes understanding the reasons for the use of the medication, its potential risks and benefits, other alternative treatment(s), and the probable consequences that may occur if the proposed medication is not given.  The patient has been given ample time to ask questions and study the information and find the information to be specific, accurate, and complete.  The patient gives verbal consent for the medication(s) proposed/prescribed, they verbalized understanding that they can refuse and withdraw consent at any time with the assistance of this APRN, and the patient has verbally confirmed that they are aware, and are willing, to take the prescribed medication and follow the treatment plan with the known possible risks, side effect, black box warnings, and any potential medication interactions, and the patient reports they will be worse off without this medication and treatment plan.  The patient is advised to contact this APRN/this office if any questions or concerns arise at any time (at [phone]), or call 911/go to the closest emergency department if needed or outside of office hours.      [facility]  Clinic No Show Policy:  We understand unexpected circumstances arise; however, anytime you miss your appointment we are unable to provide you appropriate care.  In addition, each appointment missed could have been used to provide care for others.  We ask that you call at least 24 hours in advance to cancel or reschedule an appointment.  We would like to take this opportunity to remind you of our policy stating patients who miss THREE or more appointments without cancelling or rescheduling 24 hours in advance of the appointment may be subject to cancellation of any further visits with our clinic and recommendation to seek in-person services/visits.    Please call [phone] to reschedule your appointment. If there are reasons that make it difficult for you to keep the appointments, please call and let us know how we can help.  Please understand that medication prescribing will not continue without seeing your provider.      [facility]'s No Show Policy reviewed with patient at today's visit. Patient verbalized understanding of this policy. Discussed with patient that in the event that there are three or more no show visits, it will be recommended that they pursue in-person services/visits as noncompliance with telehealth visits indicates that patient is not an appropriate candidate for telemedicine and would likely be more appropriate for in-person services/visits. Patient verbalizes understanding and is agreeable to this.    MEDS ORDERED DURING VISIT:  New Medications Ordered This Visit   Medications    busPIRone (BUSPAR) 10 MG tablet     Sig: Take 1 tablet by mouth 3 (Three) Times a Day.     Dispense:  90 tablet     Refill:  2     Please discontinue buspirone 7.5mg po tid       Patient informed this provider is leaving the practice. Patient would like to switch to inperson visits. Will send referral for Dr. Rojas's office in [facility].          Progress toward goal: At  goal    Functional Status: No impairment    Prognosis: Good with Ongoing Treatment     This document has been electronically signed by ELIANA Bah  May 14, 2024 15:15 EDT      Please note that portions of this note were completed with a voice recognition program.

## 2024-06-28 DIAGNOSIS — F51.05 INSOMNIA DUE TO MENTAL DISORDER: ICD-10-CM

## 2024-06-28 DIAGNOSIS — F41.1 GENERALIZED ANXIETY DISORDER: ICD-10-CM

## 2024-06-28 DIAGNOSIS — F33.1 MAJOR DEPRESSIVE DISORDER, RECURRENT EPISODE, MODERATE: ICD-10-CM

## 2024-06-28 NOTE — TELEPHONE ENCOUNTER
Pt called requesting refills.  Pt states that he doesn't have an initial appt with Dr Rojas until 08/22 and his pharmacy will not refill his medication because previous provider is not in the system any longer.

## 2024-07-01 RX ORDER — VORTIOXETINE 20 MG/1
1 TABLET, FILM COATED ORAL
Qty: 30 TABLET | Refills: 2 | Status: SHIPPED | OUTPATIENT
Start: 2024-07-01

## 2024-07-01 RX ORDER — BUSPIRONE HYDROCHLORIDE 10 MG/1
10 TABLET ORAL 3 TIMES DAILY
Qty: 90 TABLET | Refills: 2 | Status: SHIPPED | OUTPATIENT
Start: 2024-07-01

## 2024-07-01 RX ORDER — TRAZODONE HYDROCHLORIDE 50 MG/1
50 TABLET ORAL NIGHTLY PRN
Qty: 30 TABLET | Refills: 2 | Status: SHIPPED | OUTPATIENT
Start: 2024-07-01

## 2024-08-22 ENCOUNTER — TELEPHONE (OUTPATIENT)
Dept: PSYCHIATRY | Facility: CLINIC | Age: 49
End: 2024-08-22

## 2024-08-22 ENCOUNTER — OFFICE VISIT (OUTPATIENT)
Dept: PSYCHIATRY | Facility: CLINIC | Age: 49
End: 2024-08-22
Payer: MEDICAID

## 2024-08-22 VITALS
HEART RATE: 79 BPM | WEIGHT: 261 LBS | BODY MASS INDEX: 37.37 KG/M2 | SYSTOLIC BLOOD PRESSURE: 124 MMHG | HEIGHT: 70 IN | DIASTOLIC BLOOD PRESSURE: 80 MMHG

## 2024-08-22 DIAGNOSIS — F41.1 GENERALIZED ANXIETY DISORDER: ICD-10-CM

## 2024-08-22 DIAGNOSIS — F33.1 MAJOR DEPRESSIVE DISORDER, RECURRENT EPISODE, MODERATE: ICD-10-CM

## 2024-08-22 DIAGNOSIS — F41.1 GENERALIZED ANXIETY DISORDER: Primary | ICD-10-CM

## 2024-08-22 DIAGNOSIS — F33.40 RECURRENT MAJOR DEPRESSIVE DISORDER IN REMISSION: ICD-10-CM

## 2024-08-22 DIAGNOSIS — F51.05 INSOMNIA DUE TO MENTAL DISORDER: ICD-10-CM

## 2024-08-22 RX ORDER — HYDROXYZINE HYDROCHLORIDE 25 MG/1
25 TABLET, FILM COATED ORAL 3 TIMES DAILY PRN
Qty: 60 TABLET | Refills: 2 | Status: SHIPPED | OUTPATIENT
Start: 2024-08-22

## 2024-08-22 RX ORDER — VORTIOXETINE 20 MG/1
1 TABLET, FILM COATED ORAL
Qty: 30 TABLET | Refills: 5 | Status: SHIPPED | OUTPATIENT
Start: 2024-08-22 | End: 2024-08-23 | Stop reason: SDUPTHER

## 2024-08-22 RX ORDER — BUSPIRONE HYDROCHLORIDE 10 MG/1
10 TABLET ORAL 3 TIMES DAILY
Qty: 90 TABLET | Refills: 5 | Status: SHIPPED | OUTPATIENT
Start: 2024-08-22

## 2024-08-22 NOTE — TREATMENT PLAN
Multi-Disciplinary Problems (from Behavioral Health Treatment Plan)      Active Problems       Problem: Anxiety  Start Date: 08/22/24      Problem Details: The patient self-scales this problem as a 2 with 10 being the worst.    social situations, work, relationships        Goal Priority Start Date Expected End Date End Date    Patient will develop and implement behavioral and cognitive strategies to reduce anxiety and irrational fears. -- 08/22/24 02/20/25 --    Goal Details: Progress toward goal:  Not appropriate to rate progress toward goal since this is the initial treatment plan.          Goal Intervention Frequency Start Date End Date    Help patient explore past emotional issues in relation to present anxiety. Q Month 08/22/24 --    Intervention Details: Duration of treatment until remission of symptoms.          Goal Intervention Frequency Start Date End Date    Help patient develop an awareness of their cognitive and physical responses to anxiety. Q Month 08/22/24 --    Intervention Details: Duration of treatment until remission of symptoms.                  Problem: Depression  Start Date: 08/22/24      Problem Details: The patient self-scales this problem as a 2 with 10 being the worst.    social situations, work, relationships        Goal Priority Start Date Expected End Date End Date    Patient will demonstrate the ability to initiate new constructive life skills outside of sessions on a consistent basis. -- 08/22/24 02/20/25 --    Goal Details: Progress toward goal:  Not appropriate to rate progress toward goal since this is the initial treatment plan.          Goal Intervention Frequency Start Date End Date    Assist patient in setting attainable activities of daily living goals. PRN 08/22/24 --      Goal Intervention Frequency Start Date End Date    Provide education about depression Q Month 08/22/24 --    Intervention Details: Duration of treatment until remission of symptoms.          Goal  Intervention Frequency Start Date End Date    Assist patient in developing healthy coping strategies. Q Month 08/22/24 --    Intervention Details: Duration of treatment until remission of symptoms.                          Reviewed By       Christi Rojas MD 08/22/24 [id number]                     I have discussed and reviewed this treatment plan with the patient.

## 2024-08-22 NOTE — PATIENT INSTRUCTIONS
1.  Please return to clinic at your next scheduled visit.  Contact the clinic ([phone]) at least 24 hours prior in the event you need to cancel.  2.  Do no harm to yourself or others.    3.  Avoid alcohol and drugs.    4.  Take all medications as prescribed.  Please contact the clinic with any concerns. If you are in need of medication refills, please call the clinic at [phone].    5. Should you want to get in touch with your provider, Dr. Christi Rojas, please utilize [facility] or contact the office ([phone]), and staff will be able to page Dr. Rojas directly.  6.  In the event you have personal crisis, contact the following crisis numbers: Suicide Prevention Hotline 1-903.491.2958; SAAD Helpline 3-457-082-SAAD; [facility] Emergency Room [phone]; text HELLO to 198281; or 004.
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
## 2024-08-22 NOTE — PLAN OF CARE
[place] psychotherapy to help manage depression and anxiety related to social situations, work, relationships

## 2024-08-22 NOTE — TELEPHONE ENCOUNTER
Patient was in for a visit today and needs to get trintellix and buspirone refills     Please advise

## 2024-08-23 DIAGNOSIS — F33.1 MAJOR DEPRESSIVE DISORDER, RECURRENT EPISODE, MODERATE: ICD-10-CM

## 2024-08-23 DIAGNOSIS — F33.1 MAJOR DEPRESSIVE DISORDER, RECURRENT EPISODE, MODERATE: Primary | ICD-10-CM

## 2024-08-23 RX ORDER — VORTIOXETINE 20 MG/1
1 TABLET, FILM COATED ORAL
Qty: 14 TABLET | Refills: 0 | COMMUNITY
Start: 2024-08-23 | End: 2024-08-23

## 2024-08-27 ENCOUNTER — TELEPHONE (OUTPATIENT)
Dept: PSYCHIATRY | Facility: CLINIC | Age: 49
End: 2024-08-27
Payer: MEDICAID

## 2024-08-28 NOTE — TELEPHONE ENCOUNTER
I have attempted to contact this patient by phone with the following results: left message to return my call on answering machine.  I INDICATED THAT THE PA HAD BEEN APPROVED

## 2024-09-21 ENCOUNTER — APPOINTMENT (OUTPATIENT)
Dept: GENERAL RADIOLOGY | Facility: HOSPITAL | Age: 49
End: 2024-09-21
Payer: MEDICAID

## 2024-09-21 ENCOUNTER — HOSPITAL ENCOUNTER (EMERGENCY)
Facility: HOSPITAL | Age: 49
Discharge: HOME | End: 2024-09-21
Attending: EMERGENCY MEDICINE
Payer: MEDICAID

## 2024-09-21 VITALS
SYSTOLIC BLOOD PRESSURE: 135 MMHG | WEIGHT: 263.01 LBS | DIASTOLIC BLOOD PRESSURE: 90 MMHG | HEART RATE: 69 BPM | RESPIRATION RATE: 18 BRPM | OXYGEN SATURATION: 95 % | HEIGHT: 70 IN | BODY MASS INDEX: 37.65 KG/M2 | TEMPERATURE: 98.9 F

## 2024-09-21 DIAGNOSIS — R42 SPELL OF DIZZINESS: Primary | ICD-10-CM

## 2024-09-21 LAB
ALBUMIN SERPL-MCNC: 4.2 G/DL (ref 3.5–5.2)
ALBUMIN/GLOB SERPL: 1.2 G/DL
ALP SERPL-CCNC: 102 U/L (ref 39–117)
ALT SERPL W P-5'-P-CCNC: 22 U/L (ref 1–41)
AMPHET+METHAMPHET UR QL: NEGATIVE
ANION GAP SERPL CALCULATED.3IONS-SCNC: 12.6 MMOL/L (ref 5–15)
AST SERPL-CCNC: 25 U/L (ref 1–40)
BARBITURATES UR QL SCN: NEGATIVE
BASOPHILS # BLD AUTO: 0.03 10*3/MM3 (ref 0–0.2)
BASOPHILS NFR BLD AUTO: 0.6 % (ref 0–1.5)
BENZODIAZ UR QL SCN: NEGATIVE
BILIRUB SERPL-MCNC: 0.4 MG/DL (ref 0–1.2)
BILIRUB UR QL STRIP: NEGATIVE
BUN SERPL-MCNC: 18 MG/DL (ref 6–20)
BUN/CREAT SERPL: 16.8 (ref 7–25)
CALCIUM SPEC-SCNC: 9.8 MG/DL (ref 8.6–10.5)
CANNABINOIDS SERPL QL: NEGATIVE
CHLORIDE SERPL-SCNC: 99 MMOL/L (ref 98–107)
CLARITY UR: CLEAR
CO2 SERPL-SCNC: 24.4 MMOL/L (ref 22–29)
COCAINE UR QL: NEGATIVE
COLOR UR: YELLOW
CREAT SERPL-MCNC: 1.07 MG/DL (ref 0.76–1.27)
DEPRECATED RDW RBC AUTO: 40.4 FL (ref 37–54)
EGFRCR SERPLBLD CKD-EPI 2021: 86.7 ML/MIN/1.73
EOSINOPHIL # BLD AUTO: 0.11 10*3/MM3 (ref 0–0.4)
EOSINOPHIL NFR BLD AUTO: 2.4 % (ref 0.3–6.2)
ERYTHROCYTE [DISTWIDTH] IN BLOOD BY AUTOMATED COUNT: 12.7 % (ref 12.3–15.4)
FENTANYL UR-MCNC: NEGATIVE NG/ML
GLOBULIN UR ELPH-MCNC: 3.5 GM/DL
GLUCOSE SERPL-MCNC: 111 MG/DL (ref 65–99)
GLUCOSE UR STRIP-MCNC: NEGATIVE MG/DL
HCT VFR BLD AUTO: 44.5 % (ref 37.5–51)
HGB BLD-MCNC: 15.3 G/DL (ref 13–17.7)
HGB UR QL STRIP.AUTO: NEGATIVE
HOLD SPECIMEN: NORMAL
HOLD SPECIMEN: NORMAL
IMM GRANULOCYTES # BLD AUTO: 0.01 10*3/MM3 (ref 0–0.05)
IMM GRANULOCYTES NFR BLD AUTO: 0.2 % (ref 0–0.5)
KETONES UR QL STRIP: NEGATIVE
LEUKOCYTE ESTERASE UR QL STRIP.AUTO: NEGATIVE
LYMPHOCYTES # BLD AUTO: 1.66 10*3/MM3 (ref 0.7–3.1)
LYMPHOCYTES NFR BLD AUTO: 35.9 % (ref 19.6–45.3)
MAGNESIUM SERPL-MCNC: 1.7 MG/DL (ref 1.6–2.6)
MCH RBC QN AUTO: 29.8 PG (ref 26.6–33)
MCHC RBC AUTO-ENTMCNC: 34.4 G/DL (ref 31.5–35.7)
MCV RBC AUTO: 86.7 FL (ref 79–97)
METHADONE UR QL SCN: NEGATIVE
MONOCYTES # BLD AUTO: 0.36 10*3/MM3 (ref 0.1–0.9)
MONOCYTES NFR BLD AUTO: 7.8 % (ref 5–12)
NEUTROPHILS NFR BLD AUTO: 2.46 10*3/MM3 (ref 1.7–7)
NEUTROPHILS NFR BLD AUTO: 53.1 % (ref 42.7–76)
NITRITE UR QL STRIP: NEGATIVE
NRBC BLD AUTO-RTO: 0 /100 WBC (ref 0–0.2)
OPIATES UR QL: NEGATIVE
OXYCODONE UR QL SCN: NEGATIVE
PH UR STRIP.AUTO: 7.5 [PH] (ref 5–8)
PLATELET # BLD AUTO: 315 10*3/MM3 (ref 140–450)
PMV BLD AUTO: 9.9 FL (ref 6–12)
POTASSIUM SERPL-SCNC: 3.9 MMOL/L (ref 3.5–5.2)
PROT SERPL-MCNC: 7.7 G/DL (ref 6–8.5)
PROT UR QL STRIP: NEGATIVE
QT INTERVAL: 357 MS
QTC INTERVAL: 416 MS
RBC # BLD AUTO: 5.13 10*6/MM3 (ref 4.14–5.8)
SODIUM SERPL-SCNC: 136 MMOL/L (ref 136–145)
SP GR UR STRIP: 1.01 (ref 1–1.03)
TROPONIN T SERPL HS-MCNC: <6 NG/L
UROBILINOGEN UR QL STRIP: NORMAL
WBC NRBC COR # BLD AUTO: 4.63 10*3/MM3 (ref 3.4–10.8)
WHOLE BLOOD HOLD COAG: NORMAL
WHOLE BLOOD HOLD SPECIMEN: NORMAL

## 2024-09-21 PROCEDURE — 71045 X-RAY EXAM CHEST 1 VIEW: CPT

## 2024-09-21 PROCEDURE — 84484 ASSAY OF TROPONIN QUANT: CPT

## 2024-09-21 PROCEDURE — 99284 EMERGENCY DEPT VISIT MOD MDM: CPT

## 2024-09-21 PROCEDURE — 80307 DRUG TEST PRSMV CHEM ANLYZR: CPT

## 2024-09-21 PROCEDURE — 36415 COLL VENOUS BLD VENIPUNCTURE: CPT

## 2024-09-21 PROCEDURE — 93005 ELECTROCARDIOGRAM TRACING: CPT | Performed by: EMERGENCY MEDICINE

## 2024-09-21 PROCEDURE — 80053 COMPREHEN METABOLIC PANEL: CPT

## 2024-09-21 PROCEDURE — 93005 ELECTROCARDIOGRAM TRACING: CPT

## 2024-09-21 PROCEDURE — 85025 COMPLETE CBC W/AUTO DIFF WBC: CPT

## 2024-09-21 PROCEDURE — 83735 ASSAY OF MAGNESIUM: CPT

## 2024-09-21 PROCEDURE — 81003 URINALYSIS AUTO W/O SCOPE: CPT

## 2024-09-21 RX ORDER — SODIUM CHLORIDE 0.9 % (FLUSH) 0.9 %
10 SYRINGE (ML) INJECTION AS NEEDED
Status: DISCONTINUED | OUTPATIENT
Start: 2024-09-21 | End: 2024-09-21 | Stop reason: HOSPADM

## 2024-09-23 ENCOUNTER — OFFICE VISIT (OUTPATIENT)
Dept: PSYCHIATRY | Facility: CLINIC | Age: 49
End: 2024-09-23
Payer: MEDICAID

## 2024-09-23 VITALS
DIASTOLIC BLOOD PRESSURE: 64 MMHG | SYSTOLIC BLOOD PRESSURE: 126 MMHG | HEART RATE: 63 BPM | BODY MASS INDEX: 37.65 KG/M2 | WEIGHT: 263 LBS | HEIGHT: 70 IN

## 2024-09-23 DIAGNOSIS — F33.1 MAJOR DEPRESSIVE DISORDER, RECURRENT EPISODE, MODERATE: ICD-10-CM

## 2024-09-23 DIAGNOSIS — F41.1 GENERALIZED ANXIETY DISORDER: Primary | ICD-10-CM

## 2024-09-23 DIAGNOSIS — F51.05 INSOMNIA DUE TO MENTAL DISORDER: ICD-10-CM

## 2024-09-23 PROCEDURE — 99214 OFFICE O/P EST MOD 30 MIN: CPT | Performed by: STUDENT IN AN ORGANIZED HEALTH CARE EDUCATION/TRAINING PROGRAM

## 2024-09-23 PROCEDURE — 90833 PSYTX W PT W E/M 30 MIN: CPT | Performed by: STUDENT IN AN ORGANIZED HEALTH CARE EDUCATION/TRAINING PROGRAM

## 2024-09-23 PROCEDURE — 1160F RVW MEDS BY RX/DR IN RCRD: CPT | Performed by: STUDENT IN AN ORGANIZED HEALTH CARE EDUCATION/TRAINING PROGRAM

## 2024-09-23 PROCEDURE — 1159F MED LIST DOCD IN RCRD: CPT | Performed by: STUDENT IN AN ORGANIZED HEALTH CARE EDUCATION/TRAINING PROGRAM

## 2024-09-23 RX ORDER — PROPRANOLOL HCL 10 MG
10 TABLET ORAL 2 TIMES DAILY
Qty: 60 TABLET | Refills: 5 | Status: SHIPPED | OUTPATIENT
Start: 2024-09-23

## 2024-10-01 LAB
QT INTERVAL: 357 MS
QTC INTERVAL: 416 MS

## 2024-11-01 ENCOUNTER — OFFICE VISIT (OUTPATIENT)
Dept: PSYCHIATRY | Facility: CLINIC | Age: 49
End: 2024-11-01
Payer: MEDICAID

## 2024-11-01 VITALS
SYSTOLIC BLOOD PRESSURE: 123 MMHG | OXYGEN SATURATION: 98 % | BODY MASS INDEX: 38.22 KG/M2 | HEART RATE: 66 BPM | WEIGHT: 267 LBS | DIASTOLIC BLOOD PRESSURE: 80 MMHG | HEIGHT: 70 IN

## 2024-11-01 DIAGNOSIS — F51.05 INSOMNIA DUE TO MENTAL DISORDER: ICD-10-CM

## 2024-11-01 DIAGNOSIS — F41.1 GENERALIZED ANXIETY DISORDER: Primary | ICD-10-CM

## 2024-11-01 DIAGNOSIS — F33.1 MAJOR DEPRESSIVE DISORDER, RECURRENT EPISODE, MODERATE: ICD-10-CM

## 2024-11-01 RX ORDER — BUSPIRONE HYDROCHLORIDE 15 MG/1
15 TABLET ORAL 3 TIMES DAILY
Qty: 90 TABLET | Refills: 5 | Status: SHIPPED | OUTPATIENT
Start: 2024-11-01

## 2024-11-01 RX ORDER — VORTIOXETINE 20 MG/1
1 TABLET, FILM COATED ORAL
COMMUNITY
Start: 2024-10-22
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
## 2024-11-01 NOTE — PATIENT INSTRUCTIONS
1.  Please return to clinic at your next scheduled visit.  Contact the clinic ([phone]) at least 24 hours prior in the event you need to cancel.  2.  Do no harm to yourself or others.    3.  Avoid alcohol and drugs.    4.  Take all medications as prescribed.  Please contact the clinic with any concerns. If you are in need of medication refills, please call the clinic at [phone].    5. Should you want to get in touch with your provider, Dr. Christi Rojas, please utilize [employer] or contact the office ([phone]), and staff will be able to page Dr. Rojas directly.  6.  In the event you have personal crisis, contact the following crisis numbers: Suicide Prevention Hotline 1-279.540.9424; SAAD Helpline 2-582-782-SAAD; [facility] Emergency Room [phone]; text HELLO to 574277; or 287.

## 2024-11-13 NOTE — PROGRESS NOTES
"Chief Complaint  Annual Exam (The patient is coming in for an annual physical. The patient states that lately he has been having some lightheadedness and \"woozy\". He states that this happens later in the day randomly. This does not happen everyday. This initially started a few years ago. )    Subjective      History of Present Illness  The patient is a 49-year-old male who comes in for his annual wellness exam.    He is here for his annual physical examination and to receive his influenza vaccine. He has not experienced any adverse reactions to the influenza vaccine in the past.    He reports that Trintellix has been effective in managing his depression.    He is no longer seeing an endocrinologist as his T4 and TSH levels have normalized. He continues to take vitamin D supplements.    He experienced dizziness, which led him to visit the emergency room. Despite undergoing heart checks, x-rays, and blood tests, no abnormalities were found. He occasionally experiences dizziness, which he attributes to anxiety.    He has asthma but only uses his inhaler once or twice a month. He contracted COVID-19 last month, which caused him to be ill for 3 to 4 days. He did not experience any wheezing or breathing difficulties during this time.    He does not typically eat breakfast and exercises a few days a week. He avoids eating before workouts as it makes him feel sluggish.       Past Medical History:   Diagnosis Date    Alcohol abuse     Anxiety     Asthma 2022    Asthma, intrinsic c. 2018    Depression     Hypertension 2022    Insomnia     Substance abuse         Past Surgical History:   Procedure Laterality Date    DENTAL PROCEDURE          Social History     Tobacco Use   Smoking Status Former    Current packs/day: 0.00    Average packs/day: 1 pack/day for 25.0 years (25.0 ttl pk-yrs)    Types: Cigarettes    Start date: 1993    Quit date: 2018    Years since quittin.4    Passive " "exposure: Past   Smokeless Tobacco Never   Tobacco Comments    Smoked 1 pack a day for first 15 years, half a pack for last 10 years of smoking.        Patient Care Team:  Ruma Escamilla APRN as PCP - General (Nurse Practitioner)  Amy Felton MD as Consulting Physician (Endocrinology)  Christi Rojas MD as Consulting Physician (Psychiatry)    Allergies   Allergen Reactions    Erythromycin Unknown - High Severity and Unknown (See Comments)     unsure          Current Outpatient Medications:     Albuterol Sulfate, sensor, (ProAir Digihaler) 108 (90 Base) MCG/ACT aerosol powder , Inhale 2 puffs As Needed (wheezing)., Disp: 1 each, Rfl: 2    busPIRone (BUSPAR) 15 MG tablet, Take 1 tablet by mouth 3 (Three) Times a Day., Disp: 90 tablet, Rfl: 5    Creatine powder, Use., Disp: , Rfl:     hydrOXYzine (ATARAX) 25 MG tablet, Take 1 tablet by mouth 3 (Three) Times a Day As Needed for Anxiety., Disp: 60 tablet, Rfl: 2    propranolol (INDERAL) 10 MG tablet, Take 1 tablet by mouth 2 (Two) Times a Day., Disp: 60 tablet, Rfl: 5    traZODone (DESYREL) 50 MG tablet, Take 1 tablet by mouth At Night As Needed for Sleep., Disp: 30 tablet, Rfl: 2    Trintellix 20 MG tablet, Take 1 tablet by mouth Daily With Breakfast., Disp: , Rfl:     Objective     Vitals:    11/19/24 1554   BP: 116/70   BP Location: Right arm   Patient Position: Sitting   Cuff Size: Large Adult   Pulse: 65   Temp: 98.6 °F (37 °C)   TempSrc: Temporal   SpO2: 96%   Weight: 121 kg (266 lb 3.2 oz)   Height: 177.8 cm (70\")   PainSc: 0-No pain        Wt Readings from Last 3 Encounters:   11/19/24 121 kg (266 lb 3.2 oz)   11/01/24 121 kg (267 lb)   09/23/24 119 kg (263 lb)        BP Readings from Last 3 Encounters:   11/19/24 116/70   11/01/24 123/80   09/23/24 126/64          Physical Exam  Vitals reviewed.   Constitutional:       General: He is not in acute distress.  HENT:      Head: Normocephalic and atraumatic.   Eyes:      Conjunctiva/sclera: Conjunctivae " normal.   Cardiovascular:      Rate and Rhythm: Normal rate and regular rhythm.      Heart sounds: Normal heart sounds.   Pulmonary:      Effort: Pulmonary effort is normal.      Breath sounds: Normal breath sounds. No wheezing, rhonchi or rales.   Abdominal:      General: There is no distension.      Palpations: Abdomen is soft. There is no mass.      Tenderness: There is no abdominal tenderness.   Musculoskeletal:      Right lower leg: No edema.      Left lower leg: No edema.   Lymphadenopathy:      Cervical: No cervical adenopathy.   Skin:     General: Skin is warm and dry.   Neurological:      General: No focal deficit present.      Mental Status: He is alert.   Psychiatric:         Mood and Affect: Mood normal.         Thought Content: Thought content normal.                Result Review   The following data was reviewed by: ELIANA Cazares on 11/19/2024:  [x]  Tests & Results  [x]  Hospitalization/Emergency Department/Urgent Care  [x]  Internal/External Consultant Notes       Assessment and Plan     Diagnoses and all orders for this visit:    1. Annual physical exam (Primary)  -     Comprehensive Metabolic Panel; Future  -     CBC & Differential; Future  -     Lipid Panel; Future  -     TSH Rfx On Abnormal To Free T4; Future    2. Anxiety and depression    3. Dizziness    4. Mild intermittent asthma without complication    5. Vitamin D insufficiency  -     Vitamin D,25-Hydroxy; Future    6. Impaired fasting glucose  -     Hemoglobin A1c; Future    7. Screening for malignant neoplasm of prostate  -     PSA Screen; Future    8. Need for influenza vaccination  -     Fluzone >6mos ([id number])         Assessment & Plan  1. Annual wellness exam.  A comprehensive wellness panel will be ordered, including vitamin D, A1c, thyroid screening, blood count screening, and liver and kidney function screening. He is advised to fast for 12 hours prior to the blood draw. The COVID-19 booster is recommended, which can be  obtained at the pharmacy. He had COVID-19 last month with strong flu-like symptoms but no breathing issues. He is advised to wait another 2 months before getting the COVID-19 booster.    2. Depression.  He reports that Trintellix is effectively managing his depression. He is advised to continue taking Trintellix as prescribed.    3. Dizziness.  He experienced a dizziness episode that led to an emergency room visit, where no specific diagnosis was made. It is suggested that the dizziness might be related to hypoglycemia. He is advised to monitor his eating habits and ensure regular meals to prevent low blood sugar levels. Symptoms of hypoglycemia will be provided for his reference.    4. Asthma.  He reports no current issues with asthma and uses his inhaler once or twice a month. No new inhaler prescription is needed at this time.    5. Vitamin D insufficiency.  Level will be checked and he is to continue taking supplement.     Class 2 Severe Obesity (BMI >=35 and <=39.9). Obesity-related health conditions include the following: impaired fasting glucose. Obesity is unchanged. BMI is is above average; BMI management plan is completed. We discussed portion control and increasing exercise.       Patient was given instructions and counseling regarding his condition or for health maintenance advice. Please see specific information pulled into the AVS if appropriate.     Follow Up   Return in about 1 year (around 11/19/2025) for Annual physical.    Patient or patient representative verbalized consent for the use of Ambient Listening during the visit with  ELIANA Cazares for chart documentation. 11/19/2024  16:12 EST    ELIANA Cazares

## 2024-11-19 ENCOUNTER — OFFICE VISIT (OUTPATIENT)
Dept: INTERNAL MEDICINE | Age: 49
End: 2024-11-19
Payer: MEDICAID

## 2024-11-19 VITALS
BODY MASS INDEX: 38.11 KG/M2 | DIASTOLIC BLOOD PRESSURE: 70 MMHG | TEMPERATURE: 98.6 F | WEIGHT: 266.2 LBS | HEART RATE: 65 BPM | HEIGHT: 70 IN | SYSTOLIC BLOOD PRESSURE: 116 MMHG | OXYGEN SATURATION: 96 %

## 2024-11-19 DIAGNOSIS — E55.9 VITAMIN D INSUFFICIENCY: ICD-10-CM

## 2024-11-19 DIAGNOSIS — R42 DIZZINESS: ICD-10-CM

## 2024-11-19 DIAGNOSIS — F32.A ANXIETY AND DEPRESSION: Chronic | ICD-10-CM

## 2024-11-19 DIAGNOSIS — R73.01 IMPAIRED FASTING GLUCOSE: ICD-10-CM

## 2024-11-19 DIAGNOSIS — Z12.5 SCREENING FOR MALIGNANT NEOPLASM OF PROSTATE: ICD-10-CM

## 2024-11-19 DIAGNOSIS — J45.20 MILD INTERMITTENT ASTHMA WITHOUT COMPLICATION: Chronic | ICD-10-CM

## 2024-11-19 DIAGNOSIS — Z23 NEED FOR INFLUENZA VACCINATION: ICD-10-CM

## 2024-11-19 DIAGNOSIS — F41.9 ANXIETY AND DEPRESSION: Chronic | ICD-10-CM

## 2024-11-19 DIAGNOSIS — Z00.00 ANNUAL PHYSICAL EXAM: Primary | ICD-10-CM

## 2024-11-19 PROCEDURE — 99396 PREV VISIT EST AGE 40-64: CPT | Performed by: NURSE PRACTITIONER

## 2024-11-19 PROCEDURE — 90656 IIV3 VACC NO PRSV 0.5 ML IM: CPT | Performed by: NURSE PRACTITIONER

## 2024-11-19 PROCEDURE — 90471 IMMUNIZATION ADMIN: CPT | Performed by: NURSE PRACTITIONER

## 2024-11-19 PROCEDURE — 99214 OFFICE O/P EST MOD 30 MIN: CPT | Performed by: NURSE PRACTITIONER

## 2024-11-19 PROCEDURE — 1126F AMNT PAIN NOTED NONE PRSNT: CPT | Performed by: NURSE PRACTITIONER

## 2024-11-19 PROCEDURE — 1160F RVW MEDS BY RX/DR IN RCRD: CPT | Performed by: NURSE PRACTITIONER

## 2024-11-19 PROCEDURE — 1159F MED LIST DOCD IN RCRD: CPT | Performed by: NURSE PRACTITIONER

## 2024-12-03 ENCOUNTER — OFFICE VISIT (OUTPATIENT)
Dept: PSYCHIATRY | Facility: CLINIC | Age: 49
End: 2024-12-03
Payer: MEDICAID

## 2024-12-03 VITALS
WEIGHT: 265 LBS | HEART RATE: 85 BPM | HEIGHT: 70 IN | SYSTOLIC BLOOD PRESSURE: 130 MMHG | OXYGEN SATURATION: 95 % | DIASTOLIC BLOOD PRESSURE: 75 MMHG | BODY MASS INDEX: 37.94 KG/M2

## 2024-12-03 DIAGNOSIS — F41.1 GENERALIZED ANXIETY DISORDER: Primary | ICD-10-CM

## 2024-12-03 DIAGNOSIS — F33.1 MAJOR DEPRESSIVE DISORDER, RECURRENT EPISODE, MODERATE: ICD-10-CM

## 2024-12-03 DIAGNOSIS — F33.40 RECURRENT MAJOR DEPRESSIVE DISORDER IN REMISSION: ICD-10-CM

## 2024-12-03 DIAGNOSIS — F51.05 INSOMNIA DUE TO MENTAL DISORDER: ICD-10-CM

## 2024-12-03 NOTE — TREATMENT PLAN
Multi-Disciplinary Problems (from Behavioral Health Treatment Plan)      Active Problems       Problem: Anxiety  Start Date: 12/03/24      Problem Details: The patient self-scales this problem as a 3 with 10 being the worst.  social situations, work, relationships        Goal Priority Start Date Expected End Date End Date    Patient will develop and implement behavioral and cognitive strategies to reduce anxiety and irrational fears. -- 12/03/24 06/03/25 --    Goal Details: Progress toward goal:  improving          Goal Intervention Frequency Start Date End Date    Help patient explore past emotional issues in relation to present anxiety. Q Month 12/03/24 --    Intervention Details: Duration of treatment until remission of symptoms.          Goal Intervention Frequency Start Date End Date    Help patient develop an awareness of their cognitive and physical responses to anxiety. Q Month 12/03/24 --    Intervention Details: Duration of treatment until remission of symptoms.                  Problem: Depression  Start Date: 12/03/24      Problem Details: The patient self-scales this problem as a 3 with 10 being the worst.  social situations, work, relationships        Goal Priority Start Date Expected End Date End Date    Patient will demonstrate the ability to initiate new constructive life skills outside of sessions on a consistent basis. -- 12/03/24 06/03/25 --    Goal Details: Progress toward goal:  improving          Goal Intervention Frequency Start Date End Date    Assist patient in setting attainable activities of daily living goals. PRN 12/03/24 --      Goal Intervention Frequency Start Date End Date    Provide education about depression Q Month 12/03/24 --    Intervention Details: Duration of treatment until remission of symptoms.          Goal Intervention Frequency Start Date End Date    Assist patient in developing healthy coping strategies. Q Month 12/03/24 --    Intervention Details: Duration of  treatment until remission of symptoms.                          Reviewed By       Christi Rojas MD 12/03/24 2795                     I have discussed and reviewed this treatment plan with the patient.
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
## 2024-12-10 ENCOUNTER — CLINICAL SUPPORT (OUTPATIENT)
Dept: INTERNAL MEDICINE | Age: 49
End: 2024-12-10
Payer: MEDICAID

## 2024-12-10 DIAGNOSIS — Z23 NEED FOR TDAP VACCINATION: Primary | ICD-10-CM

## 2024-12-10 PROCEDURE — 90471 IMMUNIZATION ADMIN: CPT | Performed by: NURSE PRACTITIONER

## 2024-12-10 PROCEDURE — 90715 TDAP VACCINE 7 YRS/> IM: CPT | Performed by: NURSE PRACTITIONER

## 2025-01-13 ENCOUNTER — LAB (OUTPATIENT)
Dept: LAB | Facility: HOSPITAL | Age: 50
End: 2025-01-13
Payer: MEDICAID

## 2025-01-13 DIAGNOSIS — Z12.5 SCREENING FOR MALIGNANT NEOPLASM OF PROSTATE: ICD-10-CM

## 2025-01-13 DIAGNOSIS — R73.01 IMPAIRED FASTING GLUCOSE: ICD-10-CM

## 2025-01-13 DIAGNOSIS — Z00.00 ANNUAL PHYSICAL EXAM: ICD-10-CM

## 2025-01-13 DIAGNOSIS — E55.9 VITAMIN D INSUFFICIENCY: ICD-10-CM

## 2025-01-13 LAB
25(OH)D3 SERPL-MCNC: 51.2 NG/ML (ref 30–100)
ALBUMIN SERPL-MCNC: 4 G/DL (ref 3.5–5.2)
ALBUMIN/GLOB SERPL: 1 G/DL
ALP SERPL-CCNC: 95 U/L (ref 39–117)
ALT SERPL W P-5'-P-CCNC: 19 U/L (ref 1–41)
ANION GAP SERPL CALCULATED.3IONS-SCNC: 11.4 MMOL/L (ref 5–15)
AST SERPL-CCNC: 24 U/L (ref 1–40)
BASOPHILS # BLD AUTO: 0.03 10*3/MM3 (ref 0–0.2)
BASOPHILS NFR BLD AUTO: 0.6 % (ref 0–1.5)
BILIRUB SERPL-MCNC: 0.4 MG/DL (ref 0–1.2)
BUN SERPL-MCNC: 20 MG/DL (ref 6–20)
BUN/CREAT SERPL: 14.2 (ref 7–25)
CALCIUM SPEC-SCNC: 9.8 MG/DL (ref 8.6–10.5)
CHLORIDE SERPL-SCNC: 99 MMOL/L (ref 98–107)
CHOLEST SERPL-MCNC: 189 MG/DL (ref 0–200)
CO2 SERPL-SCNC: 26.6 MMOL/L (ref 22–29)
CREAT SERPL-MCNC: 1.41 MG/DL (ref 0.76–1.27)
DEPRECATED RDW RBC AUTO: 41.4 FL (ref 37–54)
EGFRCR SERPLBLD CKD-EPI 2021: 61.9 ML/MIN/1.73
EOSINOPHIL # BLD AUTO: 0.11 10*3/MM3 (ref 0–0.4)
EOSINOPHIL NFR BLD AUTO: 2.2 % (ref 0.3–6.2)
ERYTHROCYTE [DISTWIDTH] IN BLOOD BY AUTOMATED COUNT: 13.2 % (ref 12.3–15.4)
GLOBULIN UR ELPH-MCNC: 3.9 GM/DL
GLUCOSE SERPL-MCNC: 96 MG/DL (ref 65–99)
HBA1C MFR BLD: 5 % (ref 4.8–5.6)
HCT VFR BLD AUTO: 46.6 % (ref 37.5–51)
HDLC SERPL-MCNC: 48 MG/DL (ref 40–60)
HGB BLD-MCNC: 16.4 G/DL (ref 13–17.7)
IMM GRANULOCYTES # BLD AUTO: 0 10*3/MM3 (ref 0–0.05)
IMM GRANULOCYTES NFR BLD AUTO: 0 % (ref 0–0.5)
LDLC SERPL CALC-MCNC: 128 MG/DL (ref 0–100)
LDLC/HDLC SERPL: 2.65 {RATIO}
LYMPHOCYTES # BLD AUTO: 2.06 10*3/MM3 (ref 0.7–3.1)
LYMPHOCYTES NFR BLD AUTO: 41.7 % (ref 19.6–45.3)
MCH RBC QN AUTO: 30.7 PG (ref 26.6–33)
MCHC RBC AUTO-ENTMCNC: 35.2 G/DL (ref 31.5–35.7)
MCV RBC AUTO: 87.3 FL (ref 79–97)
MONOCYTES # BLD AUTO: 0.47 10*3/MM3 (ref 0.1–0.9)
MONOCYTES NFR BLD AUTO: 9.5 % (ref 5–12)
NEUTROPHILS NFR BLD AUTO: 2.27 10*3/MM3 (ref 1.7–7)
NEUTROPHILS NFR BLD AUTO: 46 % (ref 42.7–76)
NRBC BLD AUTO-RTO: 0 /100 WBC (ref 0–0.2)
PLATELET # BLD AUTO: 348 10*3/MM3 (ref 140–450)
PMV BLD AUTO: 10.2 FL (ref 6–12)
POTASSIUM SERPL-SCNC: 4.9 MMOL/L (ref 3.5–5.2)
PROT SERPL-MCNC: 7.9 G/DL (ref 6–8.5)
PSA SERPL-MCNC: 0.41 NG/ML (ref 0–4)
RBC # BLD AUTO: 5.34 10*6/MM3 (ref 4.14–5.8)
SODIUM SERPL-SCNC: 137 MMOL/L (ref 136–145)
TRIGL SERPL-MCNC: 69 MG/DL (ref 0–150)
TSH SERPL DL<=0.05 MIU/L-ACNC: 3.06 UIU/ML (ref 0.27–4.2)
VLDLC SERPL-MCNC: 13 MG/DL (ref 5–40)
WBC NRBC COR # BLD AUTO: 4.94 10*3/MM3 (ref 3.4–10.8)

## 2025-01-13 PROCEDURE — 85025 COMPLETE CBC W/AUTO DIFF WBC: CPT

## 2025-01-13 PROCEDURE — 83036 HEMOGLOBIN GLYCOSYLATED A1C: CPT

## 2025-01-13 PROCEDURE — 84443 ASSAY THYROID STIM HORMONE: CPT

## 2025-01-13 PROCEDURE — 80061 LIPID PANEL: CPT

## 2025-01-13 PROCEDURE — 80053 COMPREHEN METABOLIC PANEL: CPT

## 2025-01-13 PROCEDURE — 36415 COLL VENOUS BLD VENIPUNCTURE: CPT

## 2025-01-13 PROCEDURE — 82306 VITAMIN D 25 HYDROXY: CPT

## 2025-01-13 PROCEDURE — G0103 PSA SCREENING: HCPCS

## 2025-01-16 ENCOUNTER — OFFICE VISIT (OUTPATIENT)
Dept: PSYCHIATRY | Facility: CLINIC | Age: 50
End: 2025-01-16
Payer: MEDICAID

## 2025-01-16 VITALS
DIASTOLIC BLOOD PRESSURE: 66 MMHG | HEART RATE: 60 BPM | WEIGHT: 255.8 LBS | HEIGHT: 70 IN | BODY MASS INDEX: 36.62 KG/M2 | SYSTOLIC BLOOD PRESSURE: 126 MMHG

## 2025-01-16 DIAGNOSIS — F51.05 INSOMNIA DUE TO MENTAL DISORDER: ICD-10-CM

## 2025-01-16 DIAGNOSIS — F41.1 GENERALIZED ANXIETY DISORDER: Primary | ICD-10-CM

## 2025-01-16 DIAGNOSIS — F33.40 RECURRENT MAJOR DEPRESSIVE DISORDER IN REMISSION: ICD-10-CM
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
## 2025-02-07 DIAGNOSIS — F41.1 GENERALIZED ANXIETY DISORDER: ICD-10-CM

## 2025-02-07 RX ORDER — BUSPIRONE HYDROCHLORIDE 10 MG/1
10 TABLET ORAL 3 TIMES DAILY
Qty: 90 TABLET | Refills: 5 | OUTPATIENT
Start: 2025-02-07

## 2025-02-07 NOTE — TELEPHONE ENCOUNTER
The original prescription was reordered on 11/1/2024 by Christi Rojas MD. Renewing this prescription may not be appropriate.

## 2025-02-25 ENCOUNTER — OFFICE VISIT (OUTPATIENT)
Dept: PSYCHIATRY | Facility: CLINIC | Age: 50
End: 2025-02-25
Payer: MEDICAID

## 2025-02-25 VITALS
WEIGHT: 260.8 LBS | HEIGHT: 70 IN | HEART RATE: 68 BPM | DIASTOLIC BLOOD PRESSURE: 70 MMHG | SYSTOLIC BLOOD PRESSURE: 125 MMHG | BODY MASS INDEX: 37.34 KG/M2

## 2025-02-25 DIAGNOSIS — F41.1 GENERALIZED ANXIETY DISORDER: Primary | ICD-10-CM

## 2025-02-25 DIAGNOSIS — F33.40 RECURRENT MAJOR DEPRESSIVE DISORDER IN REMISSION: ICD-10-CM

## 2025-02-25 DIAGNOSIS — F51.05 INSOMNIA DUE TO MENTAL DISORDER: ICD-10-CM

## 2025-02-25 RX ORDER — VORTIOXETINE 20 MG/1
1 TABLET, FILM COATED ORAL
Qty: 30 TABLET | Refills: 5 | Status: SHIPPED | OUTPATIENT
Start: 2025-02-25

## 2025-02-25 NOTE — TREATMENT PLAN
Anxiety:  4/10 progressing    Goals:  Patient will develop and implement behavioral and cognitive strategies to reduce anxiety and irrational fears, monthly, using Rogerian psychotherapy and CBT where appropriate.  Help patient explore past emotional issues in relation to present anxiety, monthly, until remission of symptoms, using Rogerian psychotherapy and CBT where appropriate.  Help patient develop an awareness of their cognitive and physical responses to anxiety, monthly, until remission of symptoms, using Rogerian psychotherapy and CBT where appropriate.          Depression:  3/10 progressing    Goals:  Patient will demonstrate the ability to initiate new constructive life skills outside of sessions on a consistent basis, monthly, using Rogerian psychotherapy and CBT where appropriate.  Assist patient in setting attainable activities of daily living goals, monthly, using Rogerian psychotherapy and CBT where appropriate.  Provide education about depression, monthly, using Rogerian psychotherapy and CBT where appropriate.  Assist patient in developing healthy coping strategies, monthly, using Rogerian psychotherapy and CBT where appropriate.    Rogerian psychotherapy and CBT will be used to help accomplish the above goals and manage depression and anxiety related to social situations, work, relationships       I have discussed and reviewed this treatment plan with the patient.      Reviewed by Christi Rojas MD   02/25/2025

## 2025-02-25 NOTE — PROGRESS NOTES
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 [phone].       Return in about 6 weeks (around 4/8/2025).         This document has been electronically signed by Christi Rojas MD  February 25, 2025 12:10 EST    Dictated Utilizing Dragon Dictation: Part of this note may be an electronic transcription/translation of spoken language to printed text using the Dragon Dictation System.

## 2025-04-08 ENCOUNTER — OFFICE VISIT (OUTPATIENT)
Dept: PSYCHIATRY | Facility: CLINIC | Age: 50
End: 2025-04-08
Payer: MEDICAID

## 2025-04-08 VITALS
SYSTOLIC BLOOD PRESSURE: 124 MMHG | WEIGHT: 260.8 LBS | HEART RATE: 71 BPM | BODY MASS INDEX: 37.34 KG/M2 | DIASTOLIC BLOOD PRESSURE: 81 MMHG | HEIGHT: 70 IN

## 2025-04-08 DIAGNOSIS — F41.1 GENERALIZED ANXIETY DISORDER: Primary | ICD-10-CM

## 2025-04-08 DIAGNOSIS — F33.40 RECURRENT MAJOR DEPRESSIVE DISORDER IN REMISSION: ICD-10-CM

## 2025-04-08 DIAGNOSIS — F51.05 INSOMNIA DUE TO MENTAL DISORDER: ICD-10-CM

## 2025-04-08 RX ORDER — BUSPIRONE HYDROCHLORIDE 15 MG/1
15 TABLET ORAL 3 TIMES DAILY
Qty: 90 TABLET | Refills: 5 | Status: SHIPPED | OUTPATIENT
Start: 2025-04-08
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
## 2025-05-20 ENCOUNTER — OFFICE VISIT (OUTPATIENT)
Dept: PSYCHIATRY | Facility: CLINIC | Age: 50
End: 2025-05-20
Payer: MEDICAID

## 2025-05-20 VITALS
BODY MASS INDEX: 38.8 KG/M2 | HEIGHT: 70 IN | DIASTOLIC BLOOD PRESSURE: 84 MMHG | SYSTOLIC BLOOD PRESSURE: 144 MMHG | OXYGEN SATURATION: 98 % | WEIGHT: 271 LBS | HEART RATE: 91 BPM

## 2025-05-20 DIAGNOSIS — F33.40 RECURRENT MAJOR DEPRESSIVE DISORDER IN REMISSION: ICD-10-CM

## 2025-05-20 DIAGNOSIS — F41.1 GENERALIZED ANXIETY DISORDER: Primary | ICD-10-CM

## 2025-05-20 DIAGNOSIS — F51.05 INSOMNIA DUE TO MENTAL DISORDER: ICD-10-CM

## 2025-05-20 RX ORDER — TRAZODONE HYDROCHLORIDE 50 MG/1
50 TABLET ORAL NIGHTLY PRN
Qty: 90 TABLET | Refills: 1 | Status: SHIPPED | OUTPATIENT
Start: 2025-05-20

## 2025-05-20 NOTE — TREATMENT PLAN
Anxiety:  4/10 progressing    Goals:  Patient will develop and implement behavioral and cognitive strategies to reduce anxiety and irrational fears, monthly, using Rogerian psychotherapy and CBT where appropriate.  Help patient explore past emotional issues in relation to present anxiety, monthly, until remission of symptoms, using Rogerian psychotherapy and CBT where appropriate.  Help patient develop an awareness of their cognitive and physical responses to anxiety, monthly, until remission of symptoms, using Rogerian psychotherapy and CBT where appropriate.          Depression:  3/10 progressing    Goals:  Patient will demonstrate the ability to initiate new constructive life skills outside of sessions on a consistent basis, monthly, using Rogerian psychotherapy and CBT where appropriate.  Assist patient in setting attainable activities of daily living goals, monthly, using Rogerian psychotherapy and CBT where appropriate.  Provide education about depression, monthly, using Rogerian psychotherapy and CBT where appropriate.  Assist patient in developing healthy coping strategies, monthly, using Rogerian psychotherapy and CBT where appropriate.    Rogerian psychotherapy and CBT will be used to help accomplish the above goals and manage depression and anxiety related to social situations, work, relationships       I have discussed and reviewed this treatment plan with the patient.      Reviewed by Christi Rojas MD   05/20/2025
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
## 2025-05-23 ENCOUNTER — APPOINTMENT (OUTPATIENT)
Dept: GENERAL RADIOLOGY | Facility: HOSPITAL | Age: 50
End: 2025-05-23
Payer: MEDICAID

## 2025-05-23 ENCOUNTER — HOSPITAL ENCOUNTER (EMERGENCY)
Facility: HOSPITAL | Age: 50
Discharge: HOME | End: 2025-05-23
Attending: EMERGENCY MEDICINE
Payer: MEDICAID

## 2025-05-23 VITALS
SYSTOLIC BLOOD PRESSURE: 140 MMHG | OXYGEN SATURATION: 98 % | HEART RATE: 79 BPM | TEMPERATURE: 97.7 F | DIASTOLIC BLOOD PRESSURE: 70 MMHG | BODY MASS INDEX: 39.04 KG/M2 | HEIGHT: 70 IN | WEIGHT: 272.71 LBS | RESPIRATION RATE: 19 BRPM

## 2025-05-23 DIAGNOSIS — R42 VERTIGO: Primary | ICD-10-CM

## 2025-05-23 LAB
ALBUMIN SERPL-MCNC: 4.3 G/DL (ref 3.5–5.2)
ALBUMIN/GLOB SERPL: 1.6 G/DL
ALP SERPL-CCNC: 90 U/L (ref 39–117)
ALT SERPL W P-5'-P-CCNC: 30 U/L (ref 1–41)
ANION GAP SERPL CALCULATED.3IONS-SCNC: 13.4 MMOL/L (ref 5–15)
AST SERPL-CCNC: 31 U/L (ref 1–40)
BASOPHILS # BLD AUTO: 0.06 10*3/MM3 (ref 0–0.2)
BASOPHILS NFR BLD AUTO: 0.9 % (ref 0–1.5)
BILIRUB SERPL-MCNC: 0.3 MG/DL (ref 0–1.2)
BILIRUB UR QL STRIP: NEGATIVE
BUN SERPL-MCNC: 28 MG/DL (ref 6–20)
BUN/CREAT SERPL: 31.8 (ref 7–25)
CALCIUM SPEC-SCNC: 9.5 MG/DL (ref 8.6–10.5)
CHLORIDE SERPL-SCNC: 99 MMOL/L (ref 98–107)
CLARITY UR: CLEAR
CO2 SERPL-SCNC: 22.6 MMOL/L (ref 22–29)
COLOR UR: YELLOW
CREAT SERPL-MCNC: 0.88 MG/DL (ref 0.76–1.27)
DEPRECATED RDW RBC AUTO: 42.3 FL (ref 37–54)
EGFRCR SERPLBLD CKD-EPI 2021: 106.7 ML/MIN/1.73
EOSINOPHIL # BLD AUTO: 0.29 10*3/MM3 (ref 0–0.4)
EOSINOPHIL NFR BLD AUTO: 4.3 % (ref 0.3–6.2)
ERYTHROCYTE [DISTWIDTH] IN BLOOD BY AUTOMATED COUNT: 13.2 % (ref 12.3–15.4)
GEN 5 1HR TROPONIN T REFLEX: <6 NG/L
GLOBULIN UR ELPH-MCNC: 2.7 GM/DL
GLUCOSE SERPL-MCNC: 90 MG/DL (ref 65–99)
GLUCOSE UR STRIP-MCNC: NEGATIVE MG/DL
HCT VFR BLD AUTO: 43.4 % (ref 37.5–51)
HGB BLD-MCNC: 15.1 G/DL (ref 13–17.7)
HGB UR QL STRIP.AUTO: NEGATIVE
HOLD SPECIMEN: NORMAL
HOLD SPECIMEN: NORMAL
IMM GRANULOCYTES # BLD AUTO: 0.01 10*3/MM3 (ref 0–0.05)
IMM GRANULOCYTES NFR BLD AUTO: 0.1 % (ref 0–0.5)
KETONES UR QL STRIP: NEGATIVE
LEUKOCYTE ESTERASE UR QL STRIP.AUTO: NEGATIVE
LYMPHOCYTES # BLD AUTO: 2.18 10*3/MM3 (ref 0.7–3.1)
LYMPHOCYTES NFR BLD AUTO: 32.1 % (ref 19.6–45.3)
MAGNESIUM SERPL-MCNC: 1.9 MG/DL (ref 1.6–2.6)
MCH RBC QN AUTO: 30.6 PG (ref 26.6–33)
MCHC RBC AUTO-ENTMCNC: 34.8 G/DL (ref 31.5–35.7)
MCV RBC AUTO: 87.9 FL (ref 79–97)
MONOCYTES # BLD AUTO: 0.54 10*3/MM3 (ref 0.1–0.9)
MONOCYTES NFR BLD AUTO: 7.9 % (ref 5–12)
NEUTROPHILS NFR BLD AUTO: 3.72 10*3/MM3 (ref 1.7–7)
NEUTROPHILS NFR BLD AUTO: 54.7 % (ref 42.7–76)
NITRITE UR QL STRIP: NEGATIVE
NRBC BLD AUTO-RTO: 0 /100 WBC (ref 0–0.2)
PH UR STRIP.AUTO: 5.5 [PH] (ref 5–8)
PLATELET # BLD AUTO: 315 10*3/MM3 (ref 140–450)
PMV BLD AUTO: 9.6 FL (ref 6–12)
POTASSIUM SERPL-SCNC: 4.3 MMOL/L (ref 3.5–5.2)
PROT SERPL-MCNC: 7 G/DL (ref 6–8.5)
PROT UR QL STRIP: NEGATIVE
QT INTERVAL: 370 MS
QTC INTERVAL: 401 MS
RBC # BLD AUTO: 4.94 10*6/MM3 (ref 4.14–5.8)
SODIUM SERPL-SCNC: 135 MMOL/L (ref 136–145)
SP GR UR STRIP: 1.02 (ref 1–1.03)
TROPONIN T NUMERIC DELTA: NORMAL
TROPONIN T SERPL HS-MCNC: <6 NG/L
UROBILINOGEN UR QL STRIP: NORMAL
WBC NRBC COR # BLD AUTO: 6.8 10*3/MM3 (ref 3.4–10.8)
WHOLE BLOOD HOLD COAG: NORMAL
WHOLE BLOOD HOLD SPECIMEN: NORMAL

## 2025-05-23 PROCEDURE — 84484 ASSAY OF TROPONIN QUANT: CPT

## 2025-05-23 PROCEDURE — 81003 URINALYSIS AUTO W/O SCOPE: CPT

## 2025-05-23 PROCEDURE — 83735 ASSAY OF MAGNESIUM: CPT

## 2025-05-23 PROCEDURE — 93005 ELECTROCARDIOGRAM TRACING: CPT

## 2025-05-23 PROCEDURE — 99284 EMERGENCY DEPT VISIT MOD MDM: CPT

## 2025-05-23 PROCEDURE — 93005 ELECTROCARDIOGRAM TRACING: CPT | Performed by: EMERGENCY MEDICINE

## 2025-05-23 PROCEDURE — 84484 ASSAY OF TROPONIN QUANT: CPT | Performed by: EMERGENCY MEDICINE

## 2025-05-23 PROCEDURE — 36415 COLL VENOUS BLD VENIPUNCTURE: CPT

## 2025-05-23 PROCEDURE — 85025 COMPLETE CBC W/AUTO DIFF WBC: CPT

## 2025-05-23 PROCEDURE — 71045 X-RAY EXAM CHEST 1 VIEW: CPT

## 2025-05-23 PROCEDURE — 80053 COMPREHEN METABOLIC PANEL: CPT | Performed by: EMERGENCY MEDICINE

## 2025-05-23 RX ORDER — SODIUM CHLORIDE 0.9 % (FLUSH) 0.9 %
10 SYRINGE (ML) INJECTION AS NEEDED
Status: DISCONTINUED | OUTPATIENT
Start: 2025-05-23 | End: 2025-05-23 | Stop reason: HOSPADM

## 2025-05-23 RX ORDER — MECLIZINE HYDROCHLORIDE 25 MG/1
25 TABLET ORAL 3 TIMES DAILY PRN
Qty: 12 TABLET | Refills: 0 | Status: SHIPPED | OUTPATIENT
Start: 2025-05-23

## 2025-05-23 NOTE — ED PROVIDER NOTES
Time: 1:29 AM EDT  Date of encounter:  5/23/2025  Independent Historian/Clinical History and Information was obtained by:   Patient    History is limited by: N/A    Chief Complaint: Dizziness      History of Present Illness:  Patient is a 49 y.o. year old male who presents to the emergency department for evaluation of dizziness    Patient states tonight while he was preparing for bed he had sudden onset of a sensation of dizziness and that he may pass out.  He he felt very uneasy with this sensation and his heart began to race.  He states he was very anxious during this episode.  He believes episodes started prior to his heart racing and his anxiety.  He states he feels like he was floating in an easy.  He states he is completely back at baseline now and has no further symptoms.      Patient Care Team  Primary Care Provider: Ruma Escamilla APRN    Past Medical History:     Allergies   Allergen Reactions    Erythromycin Anaphylaxis and Unknown - High Severity     unsure     Past Medical History:   Diagnosis Date    Alcohol abuse 1997    Anxiety     Asthma     Asthma, intrinsic c. 2018    Depression 1995    Hypertension February 2022    Insomnia     Substance abuse 1995     Past Surgical History:   Procedure Laterality Date    DENTAL PROCEDURE  2022     Family History   Problem Relation Age of Onset    Cancer Mother     Anxiety disorder Mother     No Known Problems Father     No Known Problems Sister     No Known Problems Brother     No Known Problems Maternal Aunt     No Known Problems Paternal Aunt     No Known Problems Maternal Uncle     No Known Problems Paternal Uncle     No Known Problems Maternal Grandfather     No Known Problems Maternal Grandmother     Cancer Paternal Grandfather     OCD Paternal Grandmother         Hoarder    No Known Problems Cousin     ADD / ADHD Neg Hx     Alcohol abuse Neg Hx     Bipolar disorder Neg Hx     Dementia Neg Hx     Depression Neg Hx     Drug abuse Neg Hx     Paranoid  behavior Neg Hx     Schizophrenia Neg Hx     Seizures Neg Hx     Self-Injurious Behavior  Neg Hx     Suicide Attempts Neg Hx        Home Medications:  Prior to Admission medications    Medication Sig Start Date End Date Taking? Authorizing Provider   Albuterol Sulfate, sensor, (ProAir Digihaler) 108 (90 Base) MCG/ACT aerosol powder  Inhale 2 puffs As Needed (wheezing). 23   Ruma Esacmilla APRN   busPIRone (BUSPAR) 15 MG tablet Take 1 tablet by mouth 3 (Three) Times a Day. 25   Christi Rojas MD   Creatine powder Use.    Provider, MD Kelvin   propranolol (INDERAL) 10 MG tablet Take 1 tablet by mouth 2 (Two) Times a Day. 24   Christi Rojas MD   traZODone (DESYREL) 50 MG tablet Take 1 tablet by mouth At Night As Needed for Sleep. 25   Christi Rojas MD   Trintellix 20 MG tablet Take 1 tablet by mouth Daily With Breakfast. 25   Christi Rojas MD        Social History:   Social History     Tobacco Use    Smoking status: Former     Current packs/day: 0.00     Average packs/day: 1 pack/day for 25.0 years (25.0 ttl pk-yrs)     Types: Cigarettes     Start date: 1993     Quit date: 2018     Years since quittin.9     Passive exposure: Past    Smokeless tobacco: Never    Tobacco comments:     Smoked 1 pack a day for first 15 years, half a pack for last 10 years of smoking.   Vaping Use    Vaping status: Former    Substances: Nicotine, Flavoring    Devices: Pre-filled or refillable cartridge   Substance Use Topics    Alcohol use: Not Currently     Alcohol/week: 3.0 standard drinks of alcohol     Types: 3 Cans of beer per week     Comment: NONE RECENTLY 2024    Drug use: Not Currently     Frequency: 5.0 times per week     Types: Marijuana     Comment: Used to use marijuana 5 times per week, off and on last 25 y         Review of Systems:  Review of Systems   Constitutional:  Negative for chills and fever.   HENT:  Negative for congestion, ear pain and sore throat.    Eyes:   "Negative for pain.   Respiratory:  Negative for cough, chest tightness and shortness of breath.    Cardiovascular:  Negative for chest pain.   Gastrointestinal:  Negative for abdominal pain, diarrhea, nausea and vomiting.   Genitourinary:  Negative for flank pain and hematuria.   Musculoskeletal:  Negative for joint swelling.   Skin:  Negative for pallor.   Neurological:  Positive for dizziness. Negative for seizures and headaches.   All other systems reviewed and are negative.       Physical Exam:  /70   Pulse 79   Temp 97.7 °F (36.5 °C) (Oral)   Resp 19   Ht 177.8 cm (70\")   Wt 124 kg (272 lb 11.3 oz)   SpO2 98%   BMI 39.13 kg/m²     Physical Exam  Vitals and nursing note reviewed.   Constitutional:       General: He is not in acute distress.     Appearance: Normal appearance. He is not toxic-appearing.   HENT:      Head: Normocephalic and atraumatic.      Jaw: There is normal jaw occlusion.   Eyes:      General: Lids are normal.      Extraocular Movements: Extraocular movements intact.      Conjunctiva/sclera: Conjunctivae normal.      Pupils: Pupils are equal, round, and reactive to light.   Cardiovascular:      Rate and Rhythm: Normal rate and regular rhythm.      Pulses: Normal pulses.      Heart sounds: Normal heart sounds.   Pulmonary:      Effort: Pulmonary effort is normal. No respiratory distress.      Breath sounds: Normal breath sounds. No wheezing or rhonchi.   Abdominal:      General: Abdomen is flat.      Palpations: Abdomen is soft.      Tenderness: There is no abdominal tenderness. There is no guarding or rebound.   Musculoskeletal:         General: Normal range of motion.      Cervical back: Normal range of motion and neck supple.      Right lower leg: No edema.      Left lower leg: No edema.   Skin:     General: Skin is warm and dry.   Neurological:      Mental Status: He is alert and oriented to person, place, and time. Mental status is at baseline.   Psychiatric:         Mood and " Affect: Mood normal.             Medical Decision Making:      Comorbidities that affect care:    Asthma, alcohol abuse, substance use, depression    External Notes reviewed:    Previous Clinic Note: Outpatient behavioral health visit 5/20/2025      The following orders were placed and all results were independently analyzed by me:  Orders Placed This Encounter   Procedures    XR Chest 1 View    [place] Draw    Comprehensive Metabolic Panel    High Sensitivity Troponin T    Magnesium    Urinalysis With Microscopic If Indicated (No Culture) - Urine, Clean Catch    CBC Auto Differential    High Sensitivity Troponin T 1Hr    Undress & Gown    Continuous Pulse Oximetry    Vital Signs    POC Glucose Once    ECG 12 Lead Other; dizziness    CBC & Differential    Green Top (Gel)    Lavender Top    Gold Top - SST    Light Blue Top       Medications Given in the Emergency Department:  Medications - No data to display       ED Course:    ED Course as of 05/24/25 0900   Fri May 23, 2025   0216 My interpretation of EKG: Sinus rhythm 70, no acute ischemia [JS]      ED Course User Index  [JS] Dami Tipton MD       Labs:    Lab Results (last 24 hours)       ** No results found for the last 24 hours. **             Imaging:    No Radiology Exams Resulted Within Past 24 Hours        Differential Diagnosis and Discussion:    Dizziness: Based on the patient's history, signs, and symptoms, the diffential diagnosis includes but is not limited to meningitis, stroke, sepsis, subarachnoid hemorrhage, intracranial bleeding, encephalitis, vertigo, electrolyte imbalance, and metabolic disorders.    PROCEDURES:    Labs were collected in the emergency department and all labs were reviewed and interpreted by me.  X-ray were performed in the emergency department and all X-ray impressions were independently interpreted by me.  An EKG was performed and the EKG was interpreted by me.    ECG 12 Lead Other; dizziness   Preliminary Result   HEART  RATE=70  bpm   RR Hsvkgydz=741  ms   MT Hbrvdsdu=874  ms   P Horizontal Axis=8  deg   P Front Axis=38  deg   QRSD Interval=85  ms   QT Mtmybnvv=180  ms   JFwJ=316  ms   QRS Axis=41  deg   T Wave Axis=20  deg   - NORMAL ECG -   Sinus rhythm   Date and Time of Study:2025-05-23 00:56:18          Procedures    MDM                     Patient Care Considerations:    MRI: I considered ordering an MRI however .  May be performed not emergently      Consultants/Shared Management Plan:    None    Social Determinants of Health:    Patient is independent, reliable, and has access to care.       Disposition and Care Coordination:    Discharged: The patient is suitable and stable for discharge with no need for consideration of admission.    I have explained the patient´s condition, diagnoses and treatment plan based on the information available to me at this time. I have answered questions and addressed any concerns. The patient has a good  understanding of the patient´s diagnosis, condition, and treatment plan as can be expected at this point. The vital signs have been stable. The patient´s condition is stable and appropriate for discharge from the emergency department.      The patient will pursue further outpatient evaluation with the primary care physician or other designated or consulting physician as outlined in the discharge instructions. They are agreeable to this plan of care and follow-up instructions have been explained in detail. The patient has received these instructions in written format and has expressed an understanding of the discharge instructions. The patient is aware that any significant change in condition or worsening of symptoms should prompt an immediate return to this or the closest emergency department or call to 911.  I have explained discharge medications and the need for follow up with the patient/caretakers. This was also printed in the discharge instructions. Patient was discharged with the  following medications and follow up:      Medication List        New Prescriptions      meclizine 25 MG tablet  Commonly known as: ANTIVERT  Take 1 tablet by mouth 3 (Three) Times a Day As Needed for Dizziness.               Where to Get Your Medications        These medications were sent to [id number] - [place], [place] - [address] AT [facility] [phone]  - [phone] FX  [address], [place] [address]      Phone: [phone]   meclizine 25 MG tablet      Ruma Escamilla APRN  [address] [facility]  [address]  [place] [address]  [phone]    Schedule an appointment as soon as possible for a visit          Final diagnoses:   Vertigo        ED Disposition       ED Disposition   Discharge    Condition   Stable    Comment   --               This medical record created using voice recognition software.             Dami Tipton MD  05/24/25 09

## 2025-06-12 LAB
QT INTERVAL: 370 MS
QTC INTERVAL: 401 MS

## 2025-07-15 ENCOUNTER — OFFICE VISIT (OUTPATIENT)
Dept: PSYCHIATRY | Facility: CLINIC | Age: 50
End: 2025-07-15
Payer: MEDICAID

## 2025-07-15 VITALS
HEART RATE: 75 BPM | DIASTOLIC BLOOD PRESSURE: 68 MMHG | HEIGHT: 70 IN | BODY MASS INDEX: 39.94 KG/M2 | WEIGHT: 279 LBS | SYSTOLIC BLOOD PRESSURE: 122 MMHG

## 2025-07-15 DIAGNOSIS — F51.05 INSOMNIA DUE TO MENTAL DISORDER: ICD-10-CM

## 2025-07-15 DIAGNOSIS — F41.1 GENERALIZED ANXIETY DISORDER: Primary | ICD-10-CM

## 2025-07-15 DIAGNOSIS — F33.40 RECURRENT MAJOR DEPRESSIVE DISORDER IN REMISSION: ICD-10-CM

## 2025-07-15 RX ORDER — VORTIOXETINE 20 MG/1
1 TABLET, FILM COATED ORAL
Qty: 30 TABLET | Refills: 5 | Status: SHIPPED | OUTPATIENT
Start: 2025-07-15
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
## 2025-08-07 ENCOUNTER — HOSPITAL ENCOUNTER (EMERGENCY)
Facility: HOSPITAL | Age: 50
Discharge: HOME | End: 2025-08-07
Attending: EMERGENCY MEDICINE
Payer: MEDICAID

## 2025-08-07 ENCOUNTER — APPOINTMENT (OUTPATIENT)
Dept: CT IMAGING | Facility: HOSPITAL | Age: 50
End: 2025-08-07
Payer: MEDICAID

## 2025-08-07 VITALS
DIASTOLIC BLOOD PRESSURE: 81 MMHG | BODY MASS INDEX: 41.22 KG/M2 | HEART RATE: 59 BPM | OXYGEN SATURATION: 96 % | TEMPERATURE: 97.7 F | RESPIRATION RATE: 16 BRPM | HEIGHT: 70 IN | SYSTOLIC BLOOD PRESSURE: 115 MMHG | WEIGHT: 287.92 LBS

## 2025-08-07 DIAGNOSIS — S19.9XXA SOFT TISSUE INJURY OF NECK, INITIAL ENCOUNTER: Primary | ICD-10-CM

## 2025-08-07 PROCEDURE — 72125 CT NECK SPINE W/O DYE: CPT

## 2025-08-07 PROCEDURE — 99284 EMERGENCY DEPT VISIT MOD MDM: CPT

## 2025-08-25 ENCOUNTER — OFFICE VISIT (OUTPATIENT)
Dept: PSYCHIATRY | Facility: CLINIC | Age: 50
End: 2025-08-25
Payer: MEDICAID

## 2025-08-25 VITALS
HEIGHT: 70 IN | DIASTOLIC BLOOD PRESSURE: 72 MMHG | WEIGHT: 280.4 LBS | BODY MASS INDEX: 40.14 KG/M2 | HEART RATE: 63 BPM | OXYGEN SATURATION: 97 % | SYSTOLIC BLOOD PRESSURE: 115 MMHG

## 2025-08-25 DIAGNOSIS — F41.1 GENERALIZED ANXIETY DISORDER: Primary | ICD-10-CM

## 2025-08-25 DIAGNOSIS — F51.05 INSOMNIA DUE TO MENTAL DISORDER: ICD-10-CM

## 2025-08-25 DIAGNOSIS — F33.40 RECURRENT MAJOR DEPRESSIVE DISORDER IN REMISSION: ICD-10-CM

## 2025-08-25 PROCEDURE — 1159F MED LIST DOCD IN RCRD: CPT | Performed by: STUDENT IN AN ORGANIZED HEALTH CARE EDUCATION/TRAINING PROGRAM

## 2025-08-25 PROCEDURE — 90833 PSYTX W PT W E/M 30 MIN: CPT | Performed by: STUDENT IN AN ORGANIZED HEALTH CARE EDUCATION/TRAINING PROGRAM

## 2025-08-25 PROCEDURE — 1160F RVW MEDS BY RX/DR IN RCRD: CPT | Performed by: STUDENT IN AN ORGANIZED HEALTH CARE EDUCATION/TRAINING PROGRAM

## 2025-08-25 PROCEDURE — 99214 OFFICE O/P EST MOD 30 MIN: CPT | Performed by: STUDENT IN AN ORGANIZED HEALTH CARE EDUCATION/TRAINING PROGRAM

## 2025-09-01 PROBLEM — G45.9 TIA (TRANSIENT ISCHEMIC ATTACK): Status: ACTIVE | Noted: 2025-09-01
